# Patient Record
Sex: FEMALE | Race: WHITE
[De-identification: names, ages, dates, MRNs, and addresses within clinical notes are randomized per-mention and may not be internally consistent; named-entity substitution may affect disease eponyms.]

---

## 2015-12-01 VITALS — DIASTOLIC BLOOD PRESSURE: 68 MMHG | SYSTOLIC BLOOD PRESSURE: 134 MMHG

## 2017-06-02 ENCOUNTER — HOSPITAL ENCOUNTER (OUTPATIENT)
Dept: HOSPITAL 24 - LAB | Age: 77
End: 2017-06-02
Attending: INTERNAL MEDICINE
Payer: COMMERCIAL

## 2017-06-02 DIAGNOSIS — R06.09: ICD-10-CM

## 2017-06-02 DIAGNOSIS — I10: ICD-10-CM

## 2017-06-02 DIAGNOSIS — I25.10: Primary | ICD-10-CM

## 2017-06-02 LAB
ALBUMIN SERPL BCP-MCNC: 3.8 G/DL (ref 3.4–5)
ALP SERPL-CCNC: 64 UNITS/L (ref 46–116)
ALT SERPL W P-5'-P-CCNC: 16 UNITS/L (ref 12–78)
AST SERPL W P-5'-P-CCNC: 19 UNITS/L (ref 15–37)
BUN SERPL-MCNC: 22 MG/DL (ref 7–18)
CALCIUM ALBUM COR SERPL-SCNC: (no result) MG/DL (ref 8.5–10.1)
CALCIUM ALBUM COR SERPL-SCNC: 145 MMOL/L (ref 136–145)
CALCIUM SERPL-MCNC: 9.5 MG/DL (ref 8.5–10.1)
CHLORIDE SERPL-SCNC: 110 MMOL/L (ref 98–107)
CO2 SERPL-SCNC: 27.8 MMOL/L (ref 21–32)
CREAT SERPL-MCNC: 1.52 MG/DL (ref 0.55–1.02)
EGFR  BLACK RACES: 42 (ref 60–?)
GLUCOSE BLD-SCNC: 113 MG/DL (ref 65–99)
PROT SERPL-MCNC: 7.4 G/DL (ref 6.4–8.2)
SODIUM SERPL-SCNC: 145 MMOL/L (ref 136–145)

## 2017-06-02 PROCEDURE — 36415 COLL VENOUS BLD VENIPUNCTURE: CPT

## 2017-06-02 PROCEDURE — 80053 COMPREHEN METABOLIC PANEL: CPT

## 2017-07-18 ENCOUNTER — HOSPITAL ENCOUNTER (INPATIENT)
Dept: HOSPITAL 24 - ER | Age: 77
LOS: 3 days | Discharge: HOME | DRG: 690 | End: 2017-07-21
Attending: INTERNAL MEDICINE | Admitting: INTERNAL MEDICINE
Payer: COMMERCIAL

## 2017-07-18 VITALS — BODY MASS INDEX: 28.8 KG/M2

## 2017-07-18 DIAGNOSIS — R79.1: ICD-10-CM

## 2017-07-18 DIAGNOSIS — N30.00: Primary | ICD-10-CM

## 2017-07-18 DIAGNOSIS — I48.91: ICD-10-CM

## 2017-07-18 DIAGNOSIS — I50.9: ICD-10-CM

## 2017-07-18 DIAGNOSIS — I25.10: ICD-10-CM

## 2017-07-18 DIAGNOSIS — I10: ICD-10-CM

## 2017-07-18 DIAGNOSIS — R94.31: ICD-10-CM
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
## 2017-07-18 DIAGNOSIS — T78.49XA: ICD-10-CM

## 2017-07-18 DIAGNOSIS — R06.02: ICD-10-CM

## 2017-07-18 DIAGNOSIS — B37.89: ICD-10-CM

## 2017-07-18 DIAGNOSIS — R50.9: ICD-10-CM

## 2017-07-18 DIAGNOSIS — E78.2: ICD-10-CM

## 2017-07-18 DIAGNOSIS — K21.9: ICD-10-CM

## 2017-07-18 DIAGNOSIS — R53.1: ICD-10-CM

## 2017-07-18 DIAGNOSIS — R41.82: ICD-10-CM

## 2017-07-18 LAB
ALBUMIN SERPL BCP-MCNC: 3.7 G/DL (ref 3.4–5)
ALP SERPL-CCNC: 112 UNITS/L (ref 46–116)
ALT SERPL W P-5'-P-CCNC: 27 UNITS/L (ref 12–78)
AST SERPL W P-5'-P-CCNC: 30 UNITS/L (ref 15–37)
BACTERIA URNS QL MICRO: (no result) /HPF
BASOPHILS # BLD AUTO: 0.1 X10^3/UL (ref 0–0.1)
BASOPHILS NFR BLD AUTO: 0.4 % (ref 0.2–1)
BILIRUB UR QL STRIP.AUTO: NEGATIVE
BUN SERPL-MCNC: 28 MG/DL (ref 7–18)
CALCIUM ALBUM COR SERPL-SCNC: (no result) MG/DL (ref 8.5–10.1)
CALCIUM ALBUM COR SERPL-SCNC: 140 MMOL/L (ref 136–145)
CALCIUM SERPL-MCNC: 9.6 MG/DL (ref 8.5–10.1)
CHLORIDE SERPL-SCNC: 105 MMOL/L (ref 98–107)
CK MB SERPL-MCNC: 1 NG/ML (ref 0–4)
CK SERPL-CCNC: 70 UNITS/L (ref 26–192)
CKMB %: 1.4 % (ref ?–4)
CLARITY UR: (no result)
CO2 SERPL-SCNC: 25.1 MMOL/L (ref 21–32)
COLOR UR AUTO: YELLOW
CREAT SERPL-MCNC: 1.58 MG/DL (ref 0.55–1.02)
EGFR  BLACK RACES: 41 (ref 60–?)
EOSINOPHIL # BLD AUTO: 0.1 X10^3/UL (ref 0–0.2)
EOSINOPHIL NFR BLD AUTO: 0.6 % (ref 0.9–2.9)
ERYTHROCYTE [DISTWIDTH] IN BLOOD BY AUTOMATED COUNT: 15 % (ref 11.6–16.5)
GLUCOSE BLD-SCNC: 115 MG/DL (ref 65–99)
GLUCOSE UR QL STRIP.AUTO: NEGATIVE
HCT VFR BLD AUTO: 37.8 % (ref 36–47)
HGB BLD-MCNC: 12.7 G/DL (ref 12–16)
KETONES UR QL STRIP.AUTO: NEGATIVE
LEUKOCYTE ESTERASE UR QL STRIP.AUTO: (no result)
LYMPHOCYTES # BLD AUTO: 1.5 X10^3/UL (ref 1.3–2.9)
LYMPHOCYTES NFR BLD AUTO: 10.5 % (ref 21–51)
MCH RBC QN AUTO: 29.8 PG (ref 27–34)
MCHC RBC AUTO-ENTMCNC: 33.6 G/DL (ref 33–35)
MCV RBC AUTO: 88.7 FL (ref 80–100)
MONOCYTES # BLD AUTO: 1.2 X10^3/UL (ref 0.3–0.8)
MONOCYTES NFR BLD AUTO: 8.6 % (ref 0–13)
NEUTROPHILS # BLD AUTO: 11.6 X10^3/UL (ref 2.2–4.8)
NEUTROPHILS NFR BLD AUTO: 79.9 % (ref 42–75)
NITRITE UR QL STRIP.AUTO: NEGATIVE
PH UR STRIP.AUTO: 6 [PH] (ref 5–8)
PLATELET # BLD: 266 X10^3/UL (ref 150–450)
PMV BLD AUTO: 8.9 FL (ref 7.4–11)
PROT SERPL-MCNC: 8.2 G/DL (ref 6.4–8.2)
PROT UR QL STRIP.AUTO: (no result)
RBC # BLD AUTO: 4.26 X10^6/UL (ref 3.5–5.4)
RBC # UR STRIP.AUTO: (no result) /UL
RBC #/AREA URNS HPF: (no result) /HPF
SODIUM SERPL-SCNC: 140 MMOL/L (ref 136–145)
SP GR UR STRIP.AUTO: 1.01 (ref 1–1.03)
SQUAMOUS URNS QL MICRO: (no result) /HPF
TROPONIN I SERPL-MCNC: < 0.02 NG/ML (ref 0–1.5)
UROBILINOGEN UR QL STRIP.AUTO: (no result)
WBC NRBC COR # BLD AUTO: 14.5 X10^3/UL (ref 3.6–10)

## 2017-07-18 PROCEDURE — 80307 DRUG TEST PRSMV CHEM ANLYZR: CPT

## 2017-07-18 PROCEDURE — 85610 PROTHROMBIN TIME: CPT

## 2017-07-18 PROCEDURE — 87186 SC STD MICRODIL/AGAR DIL: CPT

## 2017-07-18 PROCEDURE — 99284 EMERGENCY DEPT VISIT MOD MDM: CPT

## 2017-07-18 PROCEDURE — 87086 URINE CULTURE/COLONY COUNT: CPT

## 2017-07-18 PROCEDURE — 83605 ASSAY OF LACTIC ACID: CPT

## 2017-07-18 PROCEDURE — 93005 ELECTROCARDIOGRAM TRACING: CPT

## 2017-07-18 PROCEDURE — 94640 AIRWAY INHALATION TREATMENT: CPT

## 2017-07-18 PROCEDURE — G0378 HOSPITAL OBSERVATION PER HR: HCPCS

## 2017-07-18 PROCEDURE — 36415 COLL VENOUS BLD VENIPUNCTURE: CPT

## 2017-07-18 PROCEDURE — 96365 THER/PROPH/DIAG IV INF INIT: CPT

## 2017-07-18 PROCEDURE — 82550 ASSAY OF CK (CPK): CPT

## 2017-07-18 PROCEDURE — 70450 CT HEAD/BRAIN W/O DYE: CPT

## 2017-07-18 PROCEDURE — 81001 URINALYSIS AUTO W/SCOPE: CPT

## 2017-07-18 PROCEDURE — 84484 ASSAY OF TROPONIN QUANT: CPT

## 2017-07-18 PROCEDURE — 71010: CPT

## 2017-07-18 PROCEDURE — 85025 COMPLETE CBC W/AUTO DIFF WBC: CPT

## 2017-07-18 PROCEDURE — 85730 THROMBOPLASTIN TIME PARTIAL: CPT

## 2017-07-18 PROCEDURE — 87040 BLOOD CULTURE FOR BACTERIA: CPT

## 2017-07-18 PROCEDURE — 87077 CULTURE AEROBIC IDENTIFY: CPT

## 2017-07-18 PROCEDURE — G0434 DRUG SCREEN MULTI DRUG CLASS: HCPCS

## 2017-07-18 PROCEDURE — 80053 COMPREHEN METABOLIC PANEL: CPT

## 2017-07-18 PROCEDURE — 82553 CREATINE MB FRACTION: CPT

## 2017-07-18 RX ADMIN — CEFTRIAXONE SCH MLS/HR: 1 INJECTION, POWDER, FOR SOLUTION INTRAMUSCULAR; INTRAVENOUS at 22:52

## 2017-07-18 RX ADMIN — SODIUM CHLORIDE SCH MLS/HR: 9 INJECTION, SOLUTION INTRAVENOUS at 22:53

## 2017-07-18 NOTE — RAD
HISTORY:  77-year-old female with cough, fever and altered mental status.



Study: Frontal view of the chest.



Comparison: Chest radiograph July 11, 2013.



Findings:



Surgical devices are stable.



The trachea is midline.  The cardiac silhouette is stably enlarged.  Low lung volumes with bibasilar
 atelectasis without focal consolidation, effusion or pneumothorax. Soft tissues are unremarkable.  
[place] structures are unremarkable.  



IMPRESSION: 

 

1.  Stable cardiomegaly with low lung volumes and bibasilar atelectasis, underlying infectious proce
ss not excluded, correlate clinically.





Reported By:Electronically Signed by JESSICA CARNEY MD at 7/18/2017 9:08:59 PM

## 2017-07-18 NOTE — DR.GENAD
HPI





- PCP


Primary Care Physician: jimy





- Complaint/Symptoms


Chief Complaint Doctors Comments: patient was standing not moving at home. 

seemed to have problems answering questions. Also some incoordination trying to 

get cup to lips.


Chief Complaint:: family states" she's not acting right she may have had a 

stroke"





- Nurses notes reviewed


Nurses Notes Review: Yes





- Source


History Provided: Patient





- Mode of Arrival


Mode of Arrival: Wheelchair





- Timing


Onset of Chief Complaint: 07/18/17


Came on: Suddenly





- Duration


Duration: Constant





- Location


Location: general





- Severity


Severity: Moderate





- Associated Signs and Symptoms


Associated Signs and Symptoms: fever





- Other History


Other History: heart valve surgery 6 weeks ago





PMH





- PMH


Past Medical History: Yes


Past Medical History: Hypertension, MI


Past Surgical History: Yes


Surgical History: CABG/Valve Surgery





- Family History


History of Family Medical Conditions: Yes


Family Medical History: Coronary Artery Disease





- Social History


Do you use any recreational Drugs:: No


Lives Where: Home





- infectious screening


In the last 2 months have you had wt loss of >10#?: NO


Have you had fever, night sweats or hemotysis?: No


Have you traveled outside the country in the last 6 months?: No


Isolation: Standard





PE





- Vital Signs


Vitals: 


 





Pulse Rate [Left Brachial]       91


Pulse Rate                       92


Respiratory Rate                 18


Blood Pressure [Right Arm]       149/65


Blood Pressure                   146/66


O2 Sat by Pulse Oximetry         99











ROR





- Labs Reviewed


Result Diagrams: 


 07/18/17 20:41





 07/18/17 20:41


Laboratory: 


 











WBC  14.5 X10^3/uL (3.6-10.0)  H  07/18/17  20:41    


 


RBC  4.26 X10^6/uL (3.5-5.4)   07/18/17  20:41    


 


Hgb  12.7 g/dL (12.0-16.0)   07/18/17  20:41    


 


Hct  37.8 % (36.0-47.0)   07/18/17  20:41    


 


MCV  88.7 fL (80.0-100.0)   07/18/17  20:41    


 


MCH  29.8 pg (27.0-34.0)   07/18/17  20:41    


 


MCHC  33.6 g/dL (33.0-35.0)   07/18/17  20:41    


 


RDW  15.0 % (11.6-16.5)   07/18/17  20:41    


 


Plt Count  266 X10^3/uL (150.0-450.0)   07/18/17  20:41    


 


MPV  8.9 fL (7.4-11.0)   07/18/17  20:41    


 


Neut %  79.9 % (42.0-75.0)  H  07/18/17  20:41    


 


Lymph %  10.5 % (21.0-51.0)  L  07/18/17  20:41    


 


Mono %  8.6 % (0.0-13.0)   07/18/17  20:41    


 


Eos %  0.6 % (0.9-2.9)  L  07/18/17  20:41    


 


Baso %  0.4 % (0.2-1.0)   07/18/17  20:41    


 


Neut #  11.6 x10^3/uL (2.2-4.8)  H  07/18/17  20:41    


 


Lymph #  1.5 X10^3/uL (1.3-2.9)   07/18/17  20:41    


 


Mono #  1.2 x10^3/uL (0.3-0.8)  H  07/18/17  20:41    


 


Eos #  0.1 x10^3/uL (0.0-0.2)   07/18/17  20:41    


 


Baso #  0.1 X10^3/uL (0.0-0.1)   07/18/17  20:41    


 


Absolute Nucleated RBC  0.0 /100WBC  07/18/17  20:41    


 


INR Target Range  -   07/18/17  20:41    


 


INR  2.79  (0.8-1.3)  H  07/18/17  20:41    


 


Sodium  140 mmol/L (136-145)   07/18/17  20:41    


 


Corrected Sodium  140 mmol/L (136-145)   07/18/17  20:41    


 


Potassium  4.8 mmol/L (3.5-5.1)   07/18/17  20:41    


 


Chloride  105 mmol/L ()   07/18/17  20:41    


 


Carbon Dioxide  25.1 mmol/L (21-32)   07/18/17  20:41    


 


BUN  28 mg/dL (7-18)  H  07/18/17  20:41    


 


Creatinine  1.58 mg/dL (0.55-1.02)  H  07/18/17  20:41    


 


Est GFR (MDRD) Af Amer  41  (>60)  L  07/18/17  20:41    


 


Est GFR (MDRD) Non-Af  34  (>60)  L  07/18/17  20:41    


 


Glucose  115 mg/dL (65-99)  H  07/18/17  20:41    


 


Lactic Acid  2.0 mmol/L (0.4-2.0)   07/18/17  20:41    


 


Calcium  9.6 mg/dL (8.5-10.1)   07/18/17  20:41    


 


Corrected Calcium  TNP   07/18/17  20:41    


 


Total Bilirubin  1.50 mg/dL (0.2-1.0)  H  07/18/17  20:41    


 


AST  30 Units/L (15-37)   07/18/17  20:41    


 


ALT  27 Units/L (12-78)   07/18/17  20:41    


 


Alkaline Phosphatase  112 Units/L ()   07/18/17  20:41    


 


Creatine Kinase  70 Units/L ()   07/18/17  20:41    


 


CK-MB (CK-2)  1.0 ng/mL (0-4.0)   07/18/17  20:41    


 


CK/CKMB % Calc  1.4 % (<4)   07/18/17  20:41    


 


Troponin I  < 0.02 ng/mL (0-1.5)   07/18/17  20:41    


 


Total Protein  8.2 g/dL (6.4-8.2)   07/18/17  20:41    


 


Albumin  3.7 g/dL (3.4-5.0)   07/18/17  20:41    


 


Globulin  4.5 g/dL (2.5-4.5)   07/18/17  20:41    


 


Albumin/Globulin Ratio  0.8 Ratio (1.1-2.1)  L  07/18/17  20:41    


 


Urine Opiates Screen  Negative  (NEG=<300)   07/18/17  21:41    


 


Urine Methadone Screen  Negative  (NEG=<300)   07/18/17  21:41    


 


Ur Barbiturates Screen  Negative  (NEG=<200)   07/18/17  21:41    


 


Ur Phencyclidine Scrn  Negative  (NEG=<25)   07/18/17  21:41    


 


Ur Amphetamines Screen  Negative  (NEG=<1000)   07/18/17  21:41    


 


U Benzodiazepines Scrn  Negative  (NEG=<200)   07/18/17  21:41    


 


Urine Cocaine Screen  Negative  (NEG=<300)   07/18/17  21:41    


 


U Marijuana (THC) Screen  Negative  (NEG=<50)   07/18/17  21:41    














- Diagnosis


Discharge Problem: 


Altered mental status


Qualifiers:


 Altered mental status type: unspecified Qualified Code(s): R41.82 - Altered 

mental status, unspecified





UTI (urinary tract infection)


Qualifiers:


 Urinary tract infection type: acute cystitis Hematuria presence: without 

hematuria Qualified Code(s): N30.00 - Acute cystitis without hematuria








- Discharge Plan


Disposition: 09 ADMITTED AS INPATIENT


Condition: Stable





- Follow ups/Referrals





- Instructions

## 2017-07-19 LAB
ALBUMIN SERPL BCP-MCNC: 3.2 G/DL (ref 3.4–5)
ALP SERPL-CCNC: 96 UNITS/L (ref 46–116)
ALT SERPL W P-5'-P-CCNC: 24 UNITS/L (ref 12–78)
AST SERPL W P-5'-P-CCNC: 26 UNITS/L (ref 15–37)
BASOPHILS # BLD AUTO: 0.1 X10^3/UL (ref 0–0.1)
BASOPHILS NFR BLD AUTO: 0.4 % (ref 0.2–1)
BUN SERPL-MCNC: 26 MG/DL (ref 7–18)
CALCIUM ALBUM COR SERPL-SCNC: 141 MMOL/L (ref 136–145)
CALCIUM ALBUM COR SERPL-SCNC: 9.7 MG/DL (ref 8.5–10.1)
CALCIUM SERPL-MCNC: 9.1 MG/DL (ref 8.5–10.1)
CHLORIDE SERPL-SCNC: 107 MMOL/L (ref 98–107)
CO2 SERPL-SCNC: 23.4 MMOL/L (ref 21–32)
CREAT SERPL-MCNC: 1.52 MG/DL (ref 0.55–1.02)
EGFR  BLACK RACES: 42 (ref 60–?)
EOSINOPHIL # BLD AUTO: 0 X10^3/UL (ref 0–0.2)
EOSINOPHIL NFR BLD AUTO: 0.2 % (ref 0.9–2.9)
ERYTHROCYTE [DISTWIDTH] IN BLOOD BY AUTOMATED COUNT: 15.1 % (ref 11.6–16.5)
GLUCOSE BLD-SCNC: 150 MG/DL (ref 65–99)
HCT VFR BLD AUTO: 36.8 % (ref 36–47)
HGB BLD-MCNC: 12.3 G/DL (ref 12–16)
LYMPHOCYTES # BLD AUTO: 1.4 X10^3/UL (ref 1.3–2.9)
LYMPHOCYTES NFR BLD AUTO: 7.6 % (ref 21–51)
MCH RBC QN AUTO: 29.8 PG (ref 27–34)
MCHC RBC AUTO-ENTMCNC: 33.5 G/DL (ref 33–35)
MCV RBC AUTO: 88.8 FL (ref 80–100)
MONOCYTES # BLD AUTO: 1.2 X10^3/UL (ref 0.3–0.8)
MONOCYTES NFR BLD AUTO: 6.4 % (ref 0–13)
NEUTROPHILS # BLD AUTO: 16.2 X10^3/UL (ref 2.2–4.8)
NEUTROPHILS NFR BLD AUTO: 85.4 % (ref 42–75)
PLATELET # BLD: 207 X10^3/UL (ref 150–450)
PMV BLD AUTO: 8.7 FL (ref 7.4–11)
PROT SERPL-MCNC: 7.5 G/DL (ref 6.4–8.2)
RBC # BLD AUTO: 4.14 X10^6/UL (ref 3.5–5.4)
SODIUM SERPL-SCNC: 140 MMOL/L (ref 136–145)
WBC NRBC COR # BLD AUTO: 19 X10^3/UL (ref 3.6–10)

## 2017-07-19 RX ADMIN — IPRATROPIUM BROMIDE AND ALBUTEROL SULFATE SCH EA: .5; 3 SOLUTION RESPIRATORY (INHALATION) at 16:20

## 2017-07-19 RX ADMIN — BENZONATATE SCH MG: 100 CAPSULE ORAL at 09:58

## 2017-07-19 RX ADMIN — MONTELUKAST SCH MG: 10 TABLET, FILM COATED ORAL at 21:50

## 2017-07-19 RX ADMIN — METOPROLOL TARTRATE SCH MG: 25 TABLET, FILM COATED ORAL at 12:00

## 2017-07-19 RX ADMIN — GUAIFENESIN DEXTROMETHORPHAN HYDROBROMIDE ORAL SOLUTION SCH ML: 200; 20 SOLUTION ORAL at 21:50

## 2017-07-19 RX ADMIN — AMLODIPINE BESYLATE SCH MG: 5 TABLET ORAL at 09:58

## 2017-07-19 RX ADMIN — IPRATROPIUM BROMIDE AND ALBUTEROL SULFATE SCH EA: .5; 3 SOLUTION RESPIRATORY (INHALATION) at 20:22

## 2017-07-19 RX ADMIN — GUAIFENESIN DEXTROMETHORPHAN HYDROBROMIDE ORAL SOLUTION SCH ML: 200; 20 SOLUTION ORAL at 16:51

## 2017-07-19 RX ADMIN — BENZONATATE SCH MG: 100 CAPSULE ORAL at 14:19

## 2017-07-19 RX ADMIN — BENZONATATE SCH MG: 100 CAPSULE ORAL at 21:48

## 2017-07-19 RX ADMIN — IPRATROPIUM BROMIDE AND ALBUTEROL SULFATE SCH EA: .5; 3 SOLUTION RESPIRATORY (INHALATION) at 11:42

## 2017-07-19 RX ADMIN — CEFTRIAXONE SCH MLS/HR: 1 INJECTION, POWDER, FOR SOLUTION INTRAMUSCULAR; INTRAVENOUS at 08:25

## 2017-07-19 RX ADMIN — IPRATROPIUM BROMIDE AND ALBUTEROL SULFATE SCH: .5; 3 SOLUTION RESPIRATORY (INHALATION) at 15:33

## 2017-07-19 RX ADMIN — LEVOFLOXACIN SCH MLS/HR: 500 INJECTION, SOLUTION INTRAVENOUS at 15:12

## 2017-07-19 RX ADMIN — FLUCONAZOLE SCH MLS/HR: 2 INJECTION, SOLUTION INTRAVENOUS at 18:32

## 2017-07-19 RX ADMIN — ASPIRIN SCH MG: 81 TABLET, COATED ORAL at 09:55

## 2017-07-19 RX ADMIN — WARFARIN SODIUM SCH MG: 2.5 TABLET ORAL at 21:50

## 2017-07-19 RX ADMIN — FUROSEMIDE SCH MG: 40 TABLET ORAL at 09:57

## 2017-07-19 RX ADMIN — METOPROLOL TARTRATE SCH MG: 25 TABLET, FILM COATED ORAL at 21:51

## 2017-07-19 RX ADMIN — SODIUM CHLORIDE SCH MLS/HR: 900 INJECTION INTRAVENOUS at 15:12

## 2017-07-19 RX ADMIN — ACETAMINOPHEN PRN MG: 325 TABLET, FILM COATED ORAL at 14:37

## 2017-07-19 RX ADMIN — SPIRONOLACTONE SCH MG: 25 TABLET, FILM COATED ORAL at 09:55

## 2017-07-19 RX ADMIN — SODIUM CHLORIDE SCH: 9 INJECTION, SOLUTION INTRAMUSCULAR; INTRAVENOUS; SUBCUTANEOUS at 22:24

## 2017-07-19 RX ADMIN — SODIUM CHLORIDE SCH: 9 INJECTION, SOLUTION INTRAMUSCULAR; INTRAVENOUS; SUBCUTANEOUS at 14:03

## 2017-07-19 RX ADMIN — SIMVASTATIN SCH MG: 40 TABLET, FILM COATED ORAL at 21:50

## 2017-07-19 RX ADMIN — SODIUM CHLORIDE SCH: 9 INJECTION, SOLUTION INTRAVENOUS at 16:37

## 2017-07-19 RX ADMIN — AMIODARONE HYDROCHLORIDE SCH MG: 200 TABLET ORAL at 09:56

## 2017-07-19 RX ADMIN — SODIUM CHLORIDE SCH: 9 INJECTION, SOLUTION INTRAMUSCULAR; INTRAVENOUS; SUBCUTANEOUS at 22:32

## 2017-07-19 RX ADMIN — SODIUM CHLORIDE SCH MLS/HR: 900 INJECTION INTRAVENOUS at 21:49

## 2017-07-19 RX ADMIN — IPRATROPIUM BROMIDE AND ALBUTEROL SULFATE SCH EA: .5; 3 SOLUTION RESPIRATORY (INHALATION) at 05:17

## 2017-07-19 NOTE — RAD
Chest, one view



Indication: Shortness of breath



Comparison: July 18, 2017



Findings:

There is stable mild cardiomegaly with prior median sternotomy changes again noted. Lungs are again 
hypoexpanded with unchanged streaky right basilar opacities, compatible with atelectasis. Left basil
ar opacities appear slightly more confluent on today's exam. No significant effusion or pneumothorax
 is identified.



Impression:

Increasing left basilar opacities, possibly reflecting atelectasis or developing pneumonia in the ap
propriate clinical setting. Clinical correlation is advised.





Reported By:Electronically Signed by KAYLEE ALEXANDER MD at 7/19/2017 10:52:55 AM

## 2017-07-19 NOTE — PCM.PROG
Progress Note





- Progress Note for Day of


Date: 07/19/17





- Subjective


Subjective: PATIENT WAS AWAKE AND ORIENTED TO PERSON, PLACE, AND TIME ON 

MORNING ROUNDS. FAMILY IS AT BEDSIDE. PATIENT DENIES ANY COMPLAINTS WEAKNESS OR 

PAIN AT THIS TIME. PATIENT IS NOTED WITH COUGH AND COMPLAINS OF SHORTNESS OF 

BREATH. VITALS THIS AM WERE 97.6, 82, 20, 94%, 128/61. ON AUSCULTATION, 

WHEEZING IS NOTED BILATERALLY. WE OBTAINED A CBC, CMP, AND CHEST XRAY. CMP 

REPORTED WBC 19.0. CMP REPORTED BUN 26, CREATININE 1.52, GLUCOSE 150, TOTAL 

BILIRUBIN 1.80, ALBUMIN 3.2. CHEST XRAY REPORTS INCREASING LEFT BASILAR 

OPACITIES, POSSIBLY REFLECTING ATELECTASIS OR DEVELOPING PNEUMONIA. LAB 

PERSONNEL CALLED CRITICAL RESULT OF BLOOD CULTURES GROWING GRAM POSITIVE COCCI. 

WE WILL DISCONTINUE ROCEPHIN AND START LEVAQUIN 500MG IV DAILY AND FORTAZ IV 

DAILY Q8H. WE WILL CHANGE DUONEBS TO QID. WE WILL RECHECK LABS AND FOLLOW UP 

WITH PATIENT IN AM.





- Past Medical Family Social History


Past Med/Fam/Surg Hx: No changes since H&P


Allergies: 


Allergies





No Known Drug Allergies Allergy (Verified 07/18/17 23:57)


 











- Review of Systems


ROS: No change since H&P





- Vital Signs and I&O's


Vital Signs: 


 





Temperature                      98.4 F


Pulse Rate [Left Brachial]       91


Pulse Rate                       70


Respiratory Rate                 20


Blood Pressure [Left Arm]        121/59


Blood Pressure [Right Arm]       111/60


O2 Sat by Pulse Oximetry         94








Intake and Output: 


 Intake & Output











 07/17/17 07/18/17 07/19/17 07/20/17





 11:59 11:59 11:59 11:59


 


Intake Total   35 


 


Output Total   300 


 


Balance   -265 














- Physical Exam


Oriented: Normal


Eyes: Normal.  negative: Blurred Vision, Diplopia, Discharge, Pain, Redness, 

Photophobia, Other


Ear: Normal.  negative: Right, Left, Swelling, Ecchymosis, Hemotypanum, Abrasion

, Laceration


Nose: Normal


Throat: Normal.  negative: Tonsillar Hypertrophy, Red, Exudate, Dry, Other


Respiratory: Right, Left, Wheezes


Cardiovascular: Normal.  negative: Tachycardia, Bradycardia, Irregular, S3, S4, 

Systolic, Diastolic, Murmur, Edema, Other


: Normal.  negative: Dysuria, Hematuria, Frequency, Discharge, Testicular Pain

, Bleeding, Pregnant, Other


Auscultation: Bowel Sounds: Normal.  negative: Bruit, Absent, Increased, 

Decreased, High Pitched, Other


Palpation: Normal


Tenderness: Normal.  negative: Diffuse, RUQ, RLQ, LUQ, LLQ, Epigastric, 

Periumbilical, Suprapubic, Mild, Moderate, Severe, Rebound, Guarding, Rigidity, 

Other


Skin: Normal.  negative: Decreased Turgur, Rash, Papular, Macular, Maculopapular

, Vesicular, Pustular, Petechial, Red, Tender, Hot, Diaphoresis, Wound, Bruising

, Ecchymosis, Other


Musculoskeletal: Normal.  negative: Right, Left, Shoulder, Clavicle, Arm, Elbow

, Forearm, Wrist, Hand, Hip, Thigh, Knee, Leg, Ankle, Foot, Back:Thoracic, Back:

Lumbar, Back:Midline, Back:Paraspinous, Pelvis, Swelling, Tender, Deformity, 

Pulse Deficit, Motor Deficit, Sensory Deficit, Instability, Crepitance


Psychiatric: Normal.  negative: Anxiety, Depression, Agitation, Other


Mood Description: Calm.  negative: Angry, Apathetic, Depressed, Fearful, Flat, 

Happy, Hostile, Sad, Suspicious, Withdrawn, Anxious, Appropriate, Labile


Affect: Normal.  negative: Angry, Anxious, Depressed, Flat, Hysterical, Quiet, 

Violent


Speech Pattern: Clear.  negative: Appropriate, Unclear, Inappropriate, Delayed, 

Slurred, Excessive, Aphasic, Artificially Ventilated





- Laboratory and Diagnostics


Result Diagrams: 


 07/20/17 03:40





 07/20/17 03:40


Labs: 


 Laboratory











WBC  19.0 X10^3/uL (3.6-10.0)  H  07/19/17  08:26    


 


RBC  4.14 X10^6/uL (3.5-5.4)   07/19/17  08:26    


 


Hgb  12.3 g/dL (12.0-16.0)   07/19/17  08:26    


 


Hct  36.8 % (36.0-47.0)   07/19/17  08:26    


 


MCV  88.8 fL (80.0-100.0)   07/19/17  08:26    


 


MCH  29.8 pg (27.0-34.0)   07/19/17  08:26    


 


MCHC  33.5 g/dL (33.0-35.0)   07/19/17  08:26    


 


RDW  15.1 % (11.6-16.5)   07/19/17  08:26    


 


Plt Count  207 X10^3/uL (150.0-450.0)   07/19/17  08:26    


 


MPV  8.7 fL (7.4-11.0)   07/19/17  08:26    


 


Neut %  85.4 % (42.0-75.0)  H  07/19/17  08:26    


 


Lymph %  7.6 % (21.0-51.0)  L  07/19/17  08:26    


 


Mono %  6.4 % (0.0-13.0)   07/19/17  08:26    


 


Eos %  0.2 % (0.9-2.9)  L  07/19/17  08:26    


 


Baso %  0.4 % (0.2-1.0)   07/19/17  08:26    


 


Neut #  16.2 x10^3/uL (2.2-4.8)  H  07/19/17  08:26    


 


Lymph #  1.4 X10^3/uL (1.3-2.9)   07/19/17  08:26    


 


Mono #  1.2 x10^3/uL (0.3-0.8)  H  07/19/17  08:26    


 


Eos #  0.0 x10^3/uL (0.0-0.2)   07/19/17  08:26    


 


Baso #  0.1 X10^3/uL (0.0-0.1)   07/19/17  08:26    


 


Absolute Nucleated RBC  0.1 /100WBC  07/19/17  08:26    


 


INR Target Range  -   07/19/17  09:09    


 


INR  2.97  (0.8-1.3)  H  07/19/17  09:09    


 


PTT  43.8 SECONDS (22.9-36.5)  H  07/19/17  09:09    


 


PTT Comment  -   07/19/17  09:09    


 


Sodium  140 mmol/L (136-145)   07/19/17  08:26    


 


Corrected Sodium  141 mmol/L (136-145)   07/19/17  08:26    


 


Potassium  4.5 mmol/L (3.5-5.1)   07/19/17  08:26    


 


Chloride  107 mmol/L ()   07/19/17  08:26    


 


Carbon Dioxide  23.4 mmol/L (21-32)   07/19/17  08:26    


 


BUN  26 mg/dL (7-18)  H  07/19/17  08:26    


 


Creatinine  1.52 mg/dL (0.55-1.02)  H  07/19/17  08:26    


 


Est GFR (MDRD) Af Amer  42  (>60)  L  07/19/17  08:26    


 


Est GFR (MDRD) Non-Af  35  (>60)  L  07/19/17  08:26    


 


Glucose  150 mg/dL (65-99)  H  07/19/17  08:26    


 


Lactic Acid  2.0 mmol/L (0.4-2.0)   07/18/17  20:41    


 


Calcium  9.1 mg/dL (8.5-10.1)   07/19/17  08:26    


 


Corrected Calcium  9.7 mg/dL (8.5-10.1)   07/19/17  08:26    


 


Total Bilirubin  1.80 mg/dL (0.2-1.0)  H  07/19/17  08:26    


 


AST  26 Units/L (15-37)   07/19/17  08:26    


 


ALT  24 Units/L (12-78)   07/19/17  08:26    


 


Alkaline Phosphatase  96 Units/L ()   07/19/17  08:26    


 


Creatine Kinase  70 Units/L ()   07/18/17  20:41    


 


CK-MB (CK-2)  1.0 ng/mL (0-4.0)   07/18/17  20:41    


 


CK/CKMB % Calc  1.4 % (<4)   07/18/17  20:41    


 


Troponin I  < 0.02 ng/mL (0-1.5)   07/18/17  20:41    


 


Total Protein  7.5 g/dL (6.4-8.2)   07/19/17  08:26    


 


Albumin  3.2 g/dL (3.4-5.0)  L  07/19/17  08:26    


 


Globulin  4.3 g/dL (2.5-4.5)   07/19/17  08:26    


 


Albumin/Globulin Ratio  0.7 Ratio (1.1-2.1)  L  07/19/17  08:26    


 


Specimen Type  Clean catch urine   07/18/17  21:41    


 


Urine Color  Yellow  (YELLOW)   07/18/17  21:41    


 


Urine Appearance  Slightly hazy  (CLEAR)   07/18/17  21:41    


 


Urine pH  6.0  (5.0 - 8.0)   07/18/17  21:41    


 


Ur Specific Gravity  1.015  (1.000-1.030)   07/18/17  21:41    


 


Urine Protein  1+  (NEGATIVE)   07/18/17  21:41    


 


Urine Glucose (UA)  Negative  (NEGATIVE)   07/18/17  21:41    


 


Urine Ketones  Negative  (NEGATIVE)   07/18/17  21:41    


 


Urine Occult Blood  2+  (NEGATIVE)   07/18/17  21:41    


 


Urine Nitrite  Negative  (NEGATIVE)   07/18/17  21:41    


 


Urine Bilirubin  Negative  (NEGATIVE)   07/18/17  21:41    


 


Urine Urobilinogen  1+  (NORMAL)   07/18/17  21:41    


 


Ur Leukocyte Esterase  2+  (NEGATIVE)   07/18/17  21:41    


 


Urine RBC  2-6 /HPF (NEGATIVE)   07/18/17  21:41    


 


Urine WBC  10-15 /HPF (NEGATIVE)   07/18/17  21:41    


 


Ur Squamous Epith Cells  Few /HPF (NEGATIVE)   07/18/17  21:41    


 


Urine Bacteria  Trace /HPF (NEGATIVE)   07/18/17  21:41    


 


Ur Culture Indicated?  Yes/culture set up   07/18/17  21:41    


 


Urine Opiates Screen  Negative  (NEG=<300)   07/18/17  21:41    


 


Urine Methadone Screen  Negative  (NEG=<300)   07/18/17  21:41    


 


Ur Barbiturates Screen  Negative  (NEG=<200)   07/18/17  21:41    


 


Ur Phencyclidine Scrn  Negative  (NEG=<25)   07/18/17  21:41    


 


Ur Amphetamines Screen  Negative  (NEG=<1000)   07/18/17  21:41    


 


U Benzodiazepines Scrn  Negative  (NEG=<200)   07/18/17  21:41    


 


Urine Cocaine Screen  Negative  (NEG=<300)   07/18/17  21:41    


 


U Marijuana (THC) Screen  Negative  (NEG=<50)   07/18/17  21:41    














- Plan


(1) UTI (urinary tract infection)


Status: Acute   


Qualifiers: 


   Urinary tract infection type: acute cystitis   Hematuria presence: without 

hematuria   Indwelling urinary catheter type: I   Encounter type: E   Qualified 

Code(s): N30.00 - Acute cystitis without hematuria   


Plan: DISCONTINUE ROCEPHIN 1 GM IV DAILY, START FORTAZ 1 GRAM IVQ8H AND 

LEVAQUIN 500MG IV DAILY, CONTINUE TO MONITOR   





(2) Fever


Status: Acute   


Qualifiers: 


   Fever type: unspecified   Encounter type: E   Qualified Code(s): R50.9 - 

Fever, unspecified   


Plan: TYLENOL 650MG PO Q4H PRN, CONTINUE TO MONITOR   





(3) Weakness generalized


Status: Acute   


Plan: CONTINUE IV FLUIDS, CONTINUE TO MONITOR

## 2017-07-19 NOTE — DR.CARTERS
Short Stay Summary





- Short Stay Summary for:


Short Stay Summary for Date of:: 07/19/17





- Admission Date


Date of Admission: 07/18/17





- Discharge Date


Discharge Date: 07/19/17





- Admission Diagnoses


(1) UTI (urinary tract infection)


Status: Acute   





- Hospital Course


Hospital Course: 


DAY 1 OF HOSPITAL STAY:  IS A 77 YEAR OLD PATIENT OF OURS WHO PRESENTED 

TO THE EMERGENCY ROOM LAST NIGHT WITH COMPLAINTS OF WEAKNESS, INCOORDINATION, 

AND FEVER. FAMILY ALSO STATED THAT PATIENT HAD BEEN CONFUSED AND THAT SYMPTOMS 

STARTED SUDDENLY PRIOR TO COMING TO THE ER. PATIENT IS S/P MITRAL VALVE 

REPLACEMENT 6 WEEKS AGO. PATIENT DENIED ANY CHEST PAIN, HOWEVER, WAS NOTED WITH 

COUGH. ON ARRIVAL TO ER, VITALS WERE 101.0. 92, 18, 98%, 146/66. LABS REPORTED 

A NORMAL CBC EXCEPT WBC 14.5. CMP WITHIN NORMAL LIMITS EXCEPT VUN 28, 

CREATININE 1.58, GLUCOSE 115, TOTAL BILIRUBIN 1.50. INR 2.79. URINALYSIS 

REPORTED WBC 10-15, RBC 2-6, LEUKOCYTES 2+, OCCULT BLOOD 2+, PROTEIN 1+. WE 

ADMITTED PATIENT FOR FUTHER TREATMENT AND EVALUATION. WE WILL START PATIENT ON 

NS @80ML/HR, START ROCEPHIN 1GM IV DAILY, AND DUONEBS TID. WE WILL RECHECK LABS 

AND CHEST XRAY AND FOLLOW UP WITH PATIENT IN AM. 





DAY 2 OF HOSPITAL STAY: PATIENT WAS AWAKE AND ORIENTED TO PERSON, PLACE, AND 

TIME ON MORNING ROUNDS. FAMILY IS AT BEDSIDE. PATIENT DENIES ANY COMPLAINTS 

WEAKNESS OR PAIN AT THIS TIME. PATIENT IS NOTED WITH COUGH. VITALS THIS AM WERE 

97.6, 82, 20, 94%, 128/61. WE OBTAINED A CBC, CMP, AND CHEST XRAY. CMP REPORTED 

WBC 19.0. CMP REPORTED BUN 26, CREATININE 1.52, GLUCOSE 150, TOTAL BILIRUBIN 

1.80, ALBUMIN 3.2. CHEST XRAY REPORTS INCREASING LEFT BASILAR OPACITIES, 

POSSIBLY REFLECTING ATELECTASIS OR DEVELOPING PNEUMONIA. PATIENT VERBALIZES 

WANTING TO BE DISCHARGED TO HOME TODAY. FAMILY IS IN AGREEMENT AND STATES THAT 

SOMEONE WILL BE AT HOME TO SEE TO PATIENT AFTER DISCHARGE. WE PLANNED FOR 

DISCHARGE. INSTRUCTIONS FOR MEDICATIONS AND FOLLOW UP WERE GIVEN TO PATIENT AND 

FAMILY. BOTH VERBALIZE UNDERSTANDING. PATIENT WILL BE DISCHARGE HOME IN STABLE 

CONDITION WITH FAMILY.SHE WILL CONTINUE HOME MEDICATIONS. WE WILL START HER ON 

AUGMENTIN 875 BID FOR 10 DAYS AND TESSALON PEARLS 200MG TID PRN COUGH. PATIENT 

IS INSTRUCTED TO FOLLOW UP IN OFFICE ON FRIDAY. 








- Discharge Medications


Discharge Medications: 


 





Amiodarone HCl 1 tab PO DAILY 07/18/17 [History]


Amlodipine Besylate [NORVASC 5 MG *] 1 tab PO DAILY 07/18/17 [History]


Furosemide 1 tab PO DAILY 07/18/17 [History]


Olmesartan Medoxomil [Benicar] 1 tab PO DAILY 07/18/17 [History]


Spironolactone 0.5 tab PO DAILY 07/18/17 [History]


Warfarin Sodium 1 tab PO HS 07/18/17 [History]


Amoxicillin/Potassium Clav [Augmentin 875-125 Tablet] 1 tab PO Q12H #20 tab 07/ 19/17 [Rx]


Benzonatate [TESSALON PERLES *] 200 mg PO TID PRN #30 cap 07/19/17 [Rx]


Metoprolol Tartrate 25 mg PO HS 07/19/17 [History]











- Discharge Plan


Disposition:  HOME, SELF-CARE


Condition: Stable


Prescriptions: 


Amoxicillin/Potassium Clav [Augmentin 875-125 Tablet] 1 tab PO Q12H #20 tab


Benzonatate [TESSALON PERLES *] 200 mg PO TID PRN #30 cap


 PRN Reason: Cough





- Follow up/Referrals


Follow up/Referrals: 


Jackson Alvarado [Primary Care Provider] - 07/21/17 10:00 am





- Instructions


Instructions:  Confusion, Warfarin: What You Need to Know, Urinary Tract 

Infection, Easy-to-Read, Hypertension, Easy-to-Read, Heart Failure, Easy-to-Read

, Ciprofloxacin tablets


Additional Instructions: 


ACTIVITY AS TOLERATED. DIET AS TOLERATED. 


Forms:  Patient Portal

## 2017-07-19 NOTE — DR.H&P
H&P





- History & Physical for Day of:


H&P Date: 07/18/17





- Chief Complaint


Chief Complaint: WEAKNESS, FEVER, SHORTNESS OF BREATH





- Allergies


Allergies/Adverse Reactions: 


 Allergies











Allergy/AdvReac Type Severity Reaction Status Date / Time


 


No Known Drug Allergies Allergy   Verified 07/18/17 23:57














- History of Present Illness


History of Present Illness:  IS A 77 YEAR OLD PATIENT OF OURS WHO 

PRESENTED TO THE EMERGENCY ROOM LAST NIGHT WITH COMPLAINTS OF WEAKNESS, 

INCOORDINATION, AND FEVER. FAMILY ALSO STATED THAT PATIENT HAD BEEN CONFUSED 

AND THAT SYMPTOMS STARTED SUDDENLY PRIOR TO COMING TO THE ER. PATIENT IS S/P 

MITRAL VALVE REPLACEMENT 6 WEEKS AGO. PATIENT DENIED ANY CHEST PAIN, HOWEVER, 

WAS NOTED WITH COUGH AND SHORTNESS OF BREATH. ON ARRIVAL TO ER, VITALS WERE 

101.0. 92, 18, 98%, 146/66. LABS REPORTED A NORMAL CBC EXCEPT WBC 14.5. CMP 

WITHIN NORMAL LIMITS EXCEPT VUN 28, CREATININE 1.58, GLUCOSE 115, TOTAL 

BILIRUBIN 1.50. INR 2.79. URINALYSIS REPORTED WBC 10-15, RBC 2-6, LEUKOCYTES 2+

, OCCULT BLOOD 2+, PROTEIN 1+. WE ADMITTED PATIENT FOR FUTHER TREATMENT AND 

EVALUATION. WE WILL START PATIENT ON NS @80ML/HR, START ROCEPHIN 1GM IV DAILY, 

AND DUONEBS TID. WE WILL RECHECK LABS AND CHEST XRAY AND FOLLOW UP WITH PATIENT 

IN AM.





- Past Medical History


Past Medical History: Angina, CHF, Coronary Artery Disease, Dyslipidemia, GERD, 

Hypertension, MI


Additional Medical History: CARDIAC ARRYTHMIA





- Past Surgical History


Surgical History: CABG/Valve Surgery


Additional Surgical History: MITRAL VALVE REPLACEMENT X 2





- Family History


Family Medical History: Coronary Artery Disease





- Social History


Does patient currently use any type of tobacco product: No


Have you used tobacco products in the last 12 months: No


Type of Tobacco Use: None


Alcohol Use: None


Drug Use: None





- Medications


Home Medications: 


 





Amiodarone HCl 1 tab PO DAILY 07/18/17 [History Confirmed 07/18/17]


Amlodipine Besylate [NORVASC 5 MG *] 1 tab PO DAILY 07/18/17 [History Confirmed 

07/18/17]


Furosemide 1 tab PO DAILY 07/18/17 [History Confirmed 07/18/17]


Olmesartan Medoxomil [Benicar] 1 tab PO DAILY 07/18/17 [History Confirmed 07/18/ 17]


Spironolactone 0.5 tab PO DAILY 07/18/17 [History Confirmed 07/18/17]


Warfarin Sodium 1 tab PO HS 07/18/17 [History Confirmed 07/18/17]


Metoprolol Tartrate 25 mg PO HS 07/19/17 [History Confirmed 07/19/17]











- Review of Systems


Constitutional: Fever, Weakness


Eyes: No Symptoms Reported.  denies: See HPI, Pain, Vision Change, Conjunctivae 

Inflammation, Eyelid Inflammation, Redness, Other


ENT: No Symptoms Reported.  denies: See HPI, Ear Pain, Ear Discharge, Nose Pain

, Nose Discharge, Nose Congestion, Mouth Pain, Mouth Swelling, Throat Pain, 

Throat Swelling, Other


Respiratory: Cough, Shortness of Breath.  denies: No Symptoms Reported, See HPI

, Dry, Hemoptysis, SOB with Excertion, Pleuritic Pain, Sputum, Wheezing, Other


Cardiovascular: No Symptoms Reported.  denies: Chest Pain, See HPI, Palpitations

, Orthopnea, Paroxysmal Noc. Dyspnea, Edema, Light Headedness, Other


Gastrointestinal: No Symptoms Reported.  denies: See HPI, Nausea, Vomiting, 

Abdominal Pain, Diarrhea, Constipation, Melena, Hematochezia, Other


Genitourinary: No Symptoms Reported.  denies: See HPI, Dysuria, Frequency, 

Incontinence, Hematuria, Retention, Other


Musculoskeletal: No Symptoms Reported.  denies: See HPI, Shoulder Pain, Arm Pain

, Back Pain, Hand Pain, Leg Pain, Foot Pain, Neck Pain, Other


Skin: No Symptoms Reported.  denies: See HPI, Rash, Lesions, Jaundice, Bruising

, Wound, Ecchymosis, Other


Neurological: Weakness, Incoordination, Confusion.  denies: See HPI, Numbness, 

Change in Speech, Seizures, Other





- Physical Exam


Vital Signs: 


 





Temperature                      98.4 F


Pulse Rate [Left Brachial]       91


Pulse Rate                       70


Respiratory Rate                 20


Blood Pressure [Left Arm]        121/59


Blood Pressure [Right Arm]       111/60


O2 Sat by Pulse Oximetry         94








Oriented: Not Oriented.  negative: Normal, Time, Person, Place, Unable to test, 

Other


Eyes: Normal.  negative: Blurred Vision, Diplopia, Discharge, Pain, Redness, 

Photophobia, Other


Ear: Normal.  negative: Right, Left, Swelling, Ecchymosis, Hemotypanum, Abrasion

, Laceration


Nose: Normal


Throat: Normal.  negative: Tonsillar Hypertrophy, Red, Exudate, Dry, Other


Respiratory: Clear Throughout.  negative: Diminished Throughout, Rhonchi 

Throughout, Rales Throughout, Wheezes Throughout, RUL Clear, RML Clear, RLL 

Clear, BEVERLY Clear, LML Clear, LLL Clear, RUL Diminished, RML Diminished, RLL 

Diminished, BEVERLY Diminished, LML Diminished, LLL Diminished, RUL Absent, RML 

Absent, RLL Absent, BEVERLY Absent, LML Absent, LLL Absent, RUL Rhonchi, RML Rhonchi

, RLL Rhonchi, BEVERLY Rhonchi, LML Rhonchi, LLL Rhonchi, RUL Insp. Wheeze, RML 

Insp. Wheeze, RLL Insp. Wheeze, BEVERLY Insp.Wheeze, LML Insp.Wheeze, LLL 

Insp.Wheeze, RUL Exp. Wheeze, RML Exp. Wheeze, RLL Exp. Wheeze, BEVERLY Exp. Wheeze

, LML Exp. Wheeze, LLL Exp. Wheeze, RUL Rales, RML Rales, RLL Rales, BEVERLY Rales, 

LML Rales, LLL Rales, RUL Rub, RML Rub, RLL Rub, BEVERLY Rub, LML Rub, LLL Rub, RUL 

Squeak, RML Squeak, RLL Squeak, BEVERLY Squeak, LML Squeak, LLL Squeak


Cardiovascular: Normal.  negative: Tachycardia, Bradycardia, Irregular, S3, S4, 

Systolic, Diastolic, Murmur, Edema, Other


: Normal.  negative: Dysuria, Hematuria, Frequency, Discharge, Testicular Pain

, Bleeding, Pregnant, Other


Auscultation: Bowel Sounds: Normal.  negative: Bruit, Absent, Increased, 

Decreased, High Pitched, Other


Palpation: Normal.  negative: Spleen Enlarged, Liver Enlarged, Mass Pulsatile, 

Other


Tenderness: Normal.  negative: Diffuse, RUQ, RLQ, LUQ, LLQ, Epigastric, 

Periumbilical, Suprapubic, Mild, Moderate, Severe, Rebound, Guarding, Rigidity, 

Other


Skin: Normal.  negative: Decreased Turgur, Rash, Papular, Macular, Maculopapular

, Vesicular, Pustular, Petechial, Red, Tender, Hot, Diaphoresis, Wound, Bruising

, Ecchymosis, Other


Musculoskeletal: Normal.  negative: Right, Left, Shoulder, Clavicle, Arm, Elbow

, Forearm, Wrist, Hand, Hip, Thigh, Knee, Leg, Ankle, Foot, Back:Thoracic, Back:

Lumbar, Back:Midline, Back:Paraspinous, Pelvis, Swelling, Tender, Deformity, 

Pulse Deficit, Motor Deficit, Sensory Deficit, Instability, Crepitance


Psychiatric: Normal.  negative: Anxiety, Depression, Agitation, Other


Mood Description: Calm.  negative: Angry, Apathetic, Depressed, Fearful, Flat, 

Happy, Hostile, Sad, Suspicious, Withdrawn, Anxious, Appropriate, Labile


Affect: Normal.  negative: Angry, Anxious, Depressed, Flat, Hysterical, Quiet, 

Violent


Speech Pattern: Clear.  negative: Appropriate, Unclear, Inappropriate, Delayed, 

Slurred, Excessive, Aphasic, Artificially Ventilated





- Assessment/Plan


(1) UTI (urinary tract infection)


Qualifiers: 


   Urinary tract infection type: acute cystitis   Hematuria presence: without 

hematuria   Indwelling urinary catheter type: I   Encounter type: E   Qualified 

Code(s): N30.00 - Acute cystitis without hematuria   


Status: Acute   


Plan: START ROCEPHIN 1 GM IV DAILY, CONTINUE TO MONITOR   





(2) Altered mental status


Qualifiers: 


   Altered mental status type: unspecified   Coma depth: C   Coma timing: C   

Qualified Code(s): R41.82 - Altered mental status, unspecified   


Status: Acute   


Plan: CONTINUE TO MONITOR   





(3) Fever


Qualifiers: 


   Fever type: unspecified   Encounter type: E   Qualified Code(s): R50.9 - 

Fever, unspecified   


Status: Acute   


Plan: TYLENOL 650 MG PO Q4H PRN, CONTINUE TO MONITOR   





(4) Weakness generalized


Status: Acute   


Plan: IV FLUIDS, CONTINUE TO MONITOR, PT CONSULT

## 2017-07-20 LAB
ALBUMIN SERPL BCP-MCNC: 2.8 G/DL (ref 3.4–5)
ALP SERPL-CCNC: 86 UNITS/L (ref 46–116)
ALT SERPL W P-5'-P-CCNC: 21 UNITS/L (ref 12–78)
AST SERPL W P-5'-P-CCNC: 23 UNITS/L (ref 15–37)
BASOPHILS # BLD AUTO: 0.1 X10^3/UL (ref 0–0.1)
BASOPHILS NFR BLD AUTO: 0.9 % (ref 0.2–1)
BUN SERPL-MCNC: 27 MG/DL (ref 7–18)
CALCIUM ALBUM COR SERPL-SCNC: (no result) MMOL/L (ref 136–145)
CALCIUM ALBUM COR SERPL-SCNC: 9.3 MG/DL (ref 8.5–10.1)
CALCIUM SERPL-MCNC: 8.3 MG/DL (ref 8.5–10.1)
CHLORIDE SERPL-SCNC: 106 MMOL/L (ref 98–107)
CO2 SERPL-SCNC: 24.3 MMOL/L (ref 21–32)
CREAT SERPL-MCNC: 1.44 MG/DL (ref 0.55–1.02)
EGFR  BLACK RACES: 45 (ref 60–?)
EOSINOPHIL # BLD AUTO: 0.1 X10^3/UL (ref 0–0.2)
EOSINOPHIL NFR BLD AUTO: 0.8 % (ref 0.9–2.9)
ERYTHROCYTE [DISTWIDTH] IN BLOOD BY AUTOMATED COUNT: 15.3 % (ref 11.6–16.5)
GLUCOSE BLD-SCNC: 94 MG/DL (ref 65–99)
HCT VFR BLD AUTO: 33.8 % (ref 36–47)
HGB BLD-MCNC: 11.5 G/DL (ref 12–16)
LYMPHOCYTES # BLD AUTO: 2 X10^3/UL (ref 1.3–2.9)
LYMPHOCYTES NFR BLD AUTO: 14.4 % (ref 21–51)
MCH RBC QN AUTO: 29.9 PG (ref 27–34)
MCHC RBC AUTO-ENTMCNC: 33.9 G/DL (ref 33–35)
MCV RBC AUTO: 88.4 FL (ref 80–100)
MONOCYTES # BLD AUTO: 1.2 X10^3/UL (ref 0.3–0.8)
MONOCYTES NFR BLD AUTO: 8.9 % (ref 0–13)
NEUTROPHILS # BLD AUTO: 10.5 X10^3/UL (ref 2.2–4.8)
NEUTROPHILS NFR BLD AUTO: 75 % (ref 42–75)
PLATELET # BLD: 190 X10^3/UL (ref 150–450)
PMV BLD AUTO: 9.7 FL (ref 7.4–11)
PROT SERPL-MCNC: 7 G/DL (ref 6.4–8.2)
RBC # BLD AUTO: 3.83 X10^6/UL (ref 3.5–5.4)
SODIUM SERPL-SCNC: 139 MMOL/L (ref 136–145)
WBC NRBC COR # BLD AUTO: 13.9 X10^3/UL (ref 3.6–10)

## 2017-07-20 RX ADMIN — METOPROLOL TARTRATE SCH MG: 25 TABLET, FILM COATED ORAL at 09:58

## 2017-07-20 RX ADMIN — GUAIFENESIN DEXTROMETHORPHAN HYDROBROMIDE ORAL SOLUTION SCH ML: 200; 20 SOLUTION ORAL at 17:45

## 2017-07-20 RX ADMIN — SODIUM CHLORIDE SCH MLS/HR: 9 INJECTION, SOLUTION INTRAVENOUS at 17:46

## 2017-07-20 RX ADMIN — SODIUM CHLORIDE SCH ML: 9 INJECTION, SOLUTION INTRAMUSCULAR; INTRAVENOUS; SUBCUTANEOUS at 06:08

## 2017-07-20 RX ADMIN — IPRATROPIUM BROMIDE AND ALBUTEROL SULFATE SCH EA: .5; 3 SOLUTION RESPIRATORY (INHALATION) at 12:05

## 2017-07-20 RX ADMIN — SODIUM CHLORIDE SCH MLS/HR: 900 INJECTION INTRAVENOUS at 14:09

## 2017-07-20 RX ADMIN — IPRATROPIUM BROMIDE AND ALBUTEROL SULFATE SCH EA: .5; 3 SOLUTION RESPIRATORY (INHALATION) at 16:05

## 2017-07-20 RX ADMIN — OLMESARTAN MEDOXOMIL SCH MG: 40 TABLET, FILM COATED ORAL at 09:51

## 2017-07-20 RX ADMIN — ACETAMINOPHEN PRN MG: 325 TABLET, FILM COATED ORAL at 07:44

## 2017-07-20 RX ADMIN — FLUCONAZOLE SCH MLS/HR: 2 INJECTION, SOLUTION INTRAVENOUS at 09:51

## 2017-07-20 RX ADMIN — AMLODIPINE BESYLATE SCH MG: 5 TABLET ORAL at 09:58

## 2017-07-20 RX ADMIN — BENZONATATE SCH MG: 100 CAPSULE ORAL at 14:09

## 2017-07-20 RX ADMIN — SODIUM CHLORIDE SCH: 9 INJECTION, SOLUTION INTRAVENOUS at 01:36

## 2017-07-20 RX ADMIN — WARFARIN SODIUM SCH MG: 2.5 TABLET ORAL at 21:46

## 2017-07-20 RX ADMIN — BENZONATATE SCH MG: 100 CAPSULE ORAL at 06:07

## 2017-07-20 RX ADMIN — AMIODARONE HYDROCHLORIDE SCH MG: 200 TABLET ORAL at 09:53

## 2017-07-20 RX ADMIN — BENZONATATE SCH MG: 100 CAPSULE ORAL at 21:48

## 2017-07-20 RX ADMIN — SODIUM CHLORIDE SCH: 9 INJECTION, SOLUTION INTRAMUSCULAR; INTRAVENOUS; SUBCUTANEOUS at 17:45

## 2017-07-20 RX ADMIN — SPIRONOLACTONE SCH MG: 25 TABLET, FILM COATED ORAL at 09:54

## 2017-07-20 RX ADMIN — IPRATROPIUM BROMIDE AND ALBUTEROL SULFATE SCH EA: .5; 3 SOLUTION RESPIRATORY (INHALATION) at 20:56

## 2017-07-20 RX ADMIN — METOPROLOL TARTRATE SCH MG: 25 TABLET, FILM COATED ORAL at 21:48

## 2017-07-20 RX ADMIN — LEVOFLOXACIN SCH MLS/HR: 500 INJECTION, SOLUTION INTRAVENOUS at 09:58

## 2017-07-20 RX ADMIN — MONTELUKAST SCH MG: 10 TABLET, FILM COATED ORAL at 21:48

## 2017-07-20 RX ADMIN — SODIUM CHLORIDE SCH MLS/HR: 900 INJECTION INTRAVENOUS at 21:55

## 2017-07-20 RX ADMIN — ASPIRIN SCH MG: 81 TABLET, COATED ORAL at 09:52

## 2017-07-20 RX ADMIN — GUAIFENESIN DEXTROMETHORPHAN HYDROBROMIDE ORAL SOLUTION SCH ML: 200; 20 SOLUTION ORAL at 14:10

## 2017-07-20 RX ADMIN — GUAIFENESIN DEXTROMETHORPHAN HYDROBROMIDE ORAL SOLUTION SCH ML: 200; 20 SOLUTION ORAL at 09:51

## 2017-07-20 RX ADMIN — FUROSEMIDE SCH MG: 40 TABLET ORAL at 09:52

## 2017-07-20 RX ADMIN — SODIUM CHLORIDE SCH MLS/HR: 900 INJECTION INTRAVENOUS at 06:08

## 2017-07-20 RX ADMIN — SIMVASTATIN SCH MG: 40 TABLET, FILM COATED ORAL at 21:47

## 2017-07-20 RX ADMIN — IPRATROPIUM BROMIDE AND ALBUTEROL SULFATE SCH EA: .5; 3 SOLUTION RESPIRATORY (INHALATION) at 09:32

## 2017-07-20 RX ADMIN — GUAIFENESIN DEXTROMETHORPHAN HYDROBROMIDE ORAL SOLUTION SCH ML: 200; 20 SOLUTION ORAL at 21:50

## 2017-07-20 NOTE — RAD
HISTORY:  Cough, shortness of breath



Study:  Single-view chest, done portably



Comparison: July 19, 2017



Findings:



There is again evidence of CABG with median sternotomy sutures and metallic markers indicating coron
sun artery bypass grafts. The trachea is midline. Heart size is upper normal with mild pulmonary vas
cular congestion. Increased interstitial markings are present bilaterally. An area of linear atelect
asis or scarring is present in the right lower lobe. Improving aeration is noted in the left lung ba
se. Osseous structures are intact.



IMPRESSION:

 

Interval improvement in aeration as noted above.





Reported By:Electronically Signed by SALENA MOSES MD at 7/20/2017 6:32:39 AM

## 2017-07-20 NOTE — PCM.PROG
Progress Note





- Progress Note for Day of


Date: 07/20/17





- Subjective


Subjective: PATIENT WAS AWAKE AND ORIENTED TO PERSON, PLACE, AND TIME ON 

MORNING ROUNDS. FAMILY IS AT BEDSIDE. PATIENT IS NOTED WITH COMPLAINTS OF COUGH 

AND SHORTNESS OF BREATH. VITALS THIS AM WERE 98.6, 82, 18, 91%, 124/60. PATIENT 

WAS AFEBRILE THROUGHOUT THE NIGHT. ON AUSCULTATION, WHEEZING IS NOTED 

BILATERALLY. WE OBTAINED A CBC, CMP, AND CHEST XRAY. CMP REPORTED WBC INCREASED 

FROM 19.0 TO 13.9, HGB 11.5, HCT 33.8. CMP REPORTED BUN 27, CREATININE 1.44, 

TOTAL BILIRUBIN 1.30, ALBUMIN 2.8. CHEST XRAY REPORTS MILD PULMONARY VASCULAR 

CONGESTION. AREA OF LINEAR ATELECTASIS OR SCARRING IS PRESENT IN THE RIGHT 

LOWER LOBE. IMPROVING AERATION NOTED IN THE LEFT LUNG BASE. WE WILL START 

DIFLUCAN IV FOR YEAST INFECTION. WE WILL CONTINUE WITH CURRENT PLAN OF 

TREATMENT. WE WILL RECHECK LABS AND CHEST XRAY AND FOLLOW UP WITH PATIENT IN AM.





- Past Medical Family Social History


Past Med/Fam/Surg Hx: No changes since H&P


Allergies: 


Allergies





No Known Drug Allergies Allergy (Verified 07/18/17 23:57)


 











- Review of Systems


ROS: No change since H&P





- Vital Signs and I&O's


Vital Signs: 


 





Temperature                      98.6 F


Pulse Rate [Left Brachial]       82


Pulse Rate                       71


Respiratory Rate                 18


Blood Pressure [Left Arm]        124/60


O2 Sat by Pulse Oximetry         91








Intake and Output: 


 Intake & Output











 07/17/17 07/18/17 07/19/17 07/20/17





 11:59 11:59 11:59 11:59


 


Intake Total    1147


 


Output Total    400


 


Balance    747














- Physical Exam


Oriented: Not Oriented.  negative: Normal, Time, Person, Place, Unable to test, 

Other


Eyes: Normal.  negative: Blurred Vision, Diplopia, Discharge, Pain, Redness, 

Photophobia, Other


Ear: Normal.  negative: Right, Left, Swelling, Ecchymosis, Hemotypanum, Abrasion

, Laceration


Nose: Normal


Throat: Normal.  negative: Tonsillar Hypertrophy, Red, Exudate, Dry, Other


Respiratory: Right, Left, Wheezes


Cardiovascular: Normal.  negative: Tachycardia, Bradycardia, Irregular, S3, S4, 

Systolic, Diastolic, Murmur, Edema, Other


: Normal.  negative: Dysuria, Hematuria, Frequency, Discharge, Testicular Pain

, Bleeding, Pregnant, Other


Auscultation: Bowel Sounds: Normal.  negative: Bruit, Absent, Increased, 

Decreased, High Pitched, Other


Palpation: Normal


Tenderness: Normal.  negative: Diffuse, RUQ, RLQ, LUQ, LLQ, Epigastric, 

Periumbilical, Suprapubic, Mild, Moderate, Severe, Rebound, Guarding, Rigidity, 

Other


Skin: Normal.  negative: Decreased Turgur, Rash, Papular, Macular, Maculopapular

, Vesicular, Pustular, Petechial, Red, Tender, Hot, Diaphoresis, Wound, Bruising

, Ecchymosis, Other


Musculoskeletal: Normal.  negative: Right, Left, Shoulder, Clavicle, Arm, Elbow

, Forearm, Wrist, Hand, Hip, Thigh, Knee, Leg, Ankle, Foot, Back:Thoracic, Back:

Lumbar, Back:Midline, Back:Paraspinous, Pelvis, Swelling, Tender, Deformity, 

Pulse Deficit, Motor Deficit, Sensory Deficit, Instability, Crepitance


Psychiatric: Normal.  negative: Anxiety, Depression, Agitation, Other


Mood Description: Calm.  negative: Angry, Apathetic, Depressed, Fearful, Flat, 

Happy, Hostile, Sad, Suspicious, Withdrawn, Anxious, Appropriate, Labile


Affect: Normal.  negative: Angry, Anxious, Depressed, Flat, Hysterical, Quiet, 

Violent


Speech Pattern: Clear.  negative: Appropriate, Unclear, Inappropriate, Delayed, 

Slurred, Excessive, Aphasic, Artificially Ventilated





- Laboratory and Diagnostics


Result Diagrams: 


 07/20/17 03:40





 07/20/17 03:40


Labs: 


 Laboratory











WBC  13.9 X10^3/uL (3.6-10.0)  H  07/20/17  03:40    


 


RBC  3.83 X10^6/uL (3.5-5.4)   07/20/17  03:40    


 


Hgb  11.5 g/dL (12.0-16.0)  L  07/20/17  03:40    


 


Hct  33.8 % (36.0-47.0)  L  07/20/17  03:40    


 


MCV  88.4 fL (80.0-100.0)   07/20/17  03:40    


 


MCH  29.9 pg (27.0-34.0)   07/20/17  03:40    


 


MCHC  33.9 g/dL (33.0-35.0)   07/20/17  03:40    


 


RDW  15.3 % (11.6-16.5)   07/20/17  03:40    


 


Plt Count  190 X10^3/uL (150.0-450.0)   07/20/17  03:40    


 


MPV  9.7 fL (7.4-11.0)   07/20/17  03:40    


 


Neut %  75.0 % (42.0-75.0)   07/20/17  03:40    


 


Lymph %  14.4 % (21.0-51.0)  L  07/20/17  03:40    


 


Mono %  8.9 % (0.0-13.0)   07/20/17  03:40    


 


Eos %  0.8 % (0.9-2.9)  L  07/20/17  03:40    


 


Baso %  0.9 % (0.2-1.0)   07/20/17  03:40    


 


Neut #  10.5 x10^3/uL (2.2-4.8)  H  07/20/17  03:40    


 


Lymph #  2.0 X10^3/uL (1.3-2.9)   07/20/17  03:40    


 


Mono #  1.2 x10^3/uL (0.3-0.8)  H  07/20/17  03:40    


 


Eos #  0.1 x10^3/uL (0.0-0.2)   07/20/17  03:40    


 


Baso #  0.1 X10^3/uL (0.0-0.1)   07/20/17  03:40    


 


Absolute Nucleated RBC  0.0 /100WBC  07/20/17  03:40    


 


INR Target Range  -   07/19/17  09:09    


 


INR  2.97  (0.8-1.3)  H  07/19/17  09:09    


 


PTT  43.8 SECONDS (22.9-36.5)  H  07/19/17  09:09    


 


PTT Comment  -   07/19/17  09:09    


 


Sodium  139 mmol/L (136-145)   07/20/17  03:40    


 


Corrected Sodium  TNP   07/20/17  03:40    


 


Potassium  3.9 mmol/L (3.5-5.1)   07/20/17  03:40    


 


Chloride  106 mmol/L ()   07/20/17  03:40    


 


Carbon Dioxide  24.3 mmol/L (21-32)   07/20/17  03:40    


 


BUN  27 mg/dL (7-18)  H  07/20/17  03:40    


 


Creatinine  1.44 mg/dL (0.55-1.02)  H  07/20/17  03:40    


 


Est GFR (MDRD) Af Amer  45  (>60)  L  07/20/17  03:40    


 


Est GFR (MDRD) Non-Af  37  (>60)  L  07/20/17  03:40    


 


Glucose  94 mg/dL (65-99)   07/20/17  03:40    


 


Lactic Acid  2.0 mmol/L (0.4-2.0)   07/18/17  20:41    


 


Calcium  8.3 mg/dL (8.5-10.1)  L  07/20/17  03:40    


 


Corrected Calcium  9.3 mg/dL (8.5-10.1)   07/20/17  03:40    


 


Total Bilirubin  1.30 mg/dL (0.2-1.0)  H  07/20/17  03:40    


 


AST  23 Units/L (15-37)   07/20/17  03:40    


 


ALT  21 Units/L (12-78)   07/20/17  03:40    


 


Alkaline Phosphatase  86 Units/L ()   07/20/17  03:40    


 


Creatine Kinase  70 Units/L ()   07/18/17  20:41    


 


CK-MB (CK-2)  1.0 ng/mL (0-4.0)   07/18/17  20:41    


 


CK/CKMB % Calc  1.4 % (<4)   07/18/17  20:41    


 


Troponin I  < 0.02 ng/mL (0-1.5)   07/18/17  20:41    


 


Total Protein  7.0 g/dL (6.4-8.2)   07/20/17  03:40    


 


Albumin  2.8 g/dL (3.4-5.0)  L  07/20/17  03:40    


 


Globulin  4.2 g/dL (2.5-4.5)   07/20/17  03:40    


 


Albumin/Globulin Ratio  0.7 Ratio (1.1-2.1)  L  07/20/17  03:40    


 


Specimen Type  Clean catch urine   07/18/17  21:41    


 


Urine Color  Yellow  (YELLOW)   07/18/17  21:41    


 


Urine Appearance  Slightly hazy  (CLEAR)   07/18/17  21:41    


 


Urine pH  6.0  (5.0 - 8.0)   07/18/17  21:41    


 


Ur Specific Gravity  1.015  (1.000-1.030)   07/18/17  21:41    


 


Urine Protein  1+  (NEGATIVE)   07/18/17  21:41    


 


Urine Glucose (UA)  Negative  (NEGATIVE)   07/18/17  21:41    


 


Urine Ketones  Negative  (NEGATIVE)   07/18/17  21:41    


 


Urine Occult Blood  2+  (NEGATIVE)   07/18/17  21:41    


 


Urine Nitrite  Negative  (NEGATIVE)   07/18/17  21:41    


 


Urine Bilirubin  Negative  (NEGATIVE)   07/18/17  21:41    


 


Urine Urobilinogen  1+  (NORMAL)   07/18/17  21:41    


 


Ur Leukocyte Esterase  2+  (NEGATIVE)   07/18/17  21:41    


 


Urine RBC  2-6 /HPF (NEGATIVE)   07/18/17  21:41    


 


Urine WBC  10-15 /HPF (NEGATIVE)   07/18/17  21:41    


 


Ur Squamous Epith Cells  Few /HPF (NEGATIVE)   07/18/17  21:41    


 


Urine Bacteria  Trace /HPF (NEGATIVE)   07/18/17  21:41    


 


Ur Culture Indicated?  Yes/culture set up   07/18/17  21:41    


 


Urine Opiates Screen  Negative  (NEG=<300)   07/18/17  21:41    


 


Urine Methadone Screen  Negative  (NEG=<300)   07/18/17  21:41    


 


Ur Barbiturates Screen  Negative  (NEG=<200)   07/18/17  21:41    


 


Ur Phencyclidine Scrn  Negative  (NEG=<25)   07/18/17  21:41    


 


Ur Amphetamines Screen  Negative  (NEG=<1000)   07/18/17  21:41    


 


U Benzodiazepines Scrn  Negative  (NEG=<200)   07/18/17  21:41    


 


Urine Cocaine Screen  Negative  (NEG=<300)   07/18/17  21:41    


 


U Marijuana (THC) Screen  Negative  (NEG=<50)   07/18/17  21:41    














- Plan


(1) UTI (urinary tract infection)


Status: Acute   


Qualifiers: 


   Urinary tract infection type: acute cystitis   Hematuria presence: without 

hematuria   Indwelling urinary catheter type: I   Encounter type: E   Qualified 

Code(s): N30.00 - Acute cystitis without hematuria   


Plan: START ROCEPHIN 1 GM IV DAILY, CONTINUE TO MONITOR   





(2) Altered mental status


Status: Acute   


Qualifiers: 


   Altered mental status type: unspecified   Coma depth: C   Coma timing: C   

Qualified Code(s): R41.82 - Altered mental status, unspecified   


Plan: CONTINUE TO MONITOR   





(3) Fever


Status: Acute   


Qualifiers: 


   Fever type: unspecified   Encounter type: E   Qualified Code(s): R50.9 - 

Fever, unspecified   


Plan: TYLENOL 650 MG PO Q4H PRN, CONTINUE TO MONITOR   





(4) Weakness generalized


Status: Acute   


Plan: IV FLUIDS, CONTINUE TO MONITOR, PT CONSULT   





(5) Yeast infection


Status: Suspected   


Plan: START DIFLUCAN 200MG IV DAILY, CONTINUE TO MONITOR   





(6) Hypertension


Status: Chronic   


Qualifiers: 


   Hypertension type: essential hypertension   Qualified Code(s): I10 - 

Essential (primary) hypertension   


Plan: CONTINUE NORVASC, CONTINUE LOPRESSOR, CONTINUE BENICAR, CONTINUE TO 

MONITOR   





(7) CHF (congestive heart failure)


Status: Chronic   


Qualifiers: 


   Congestive heart failure type: unspecified congestive heart failure type   

Congestive heart failure chronicity: chronic   Qualified Code(s): I50.9 - Heart 

failure, unspecified   


Plan: CONTINUE ALDACTONE, CONTINUE LASIX, CONTINUE TO MONITOR   





(8) Environmental allergies


Status: Chronic   


Plan: CONTINUE SINGULAIR, CONTINUE ZYRTEC, CONTINUE TO MONITOR   





(9) Arrhythmia


Status: Chronic   


Qualifiers: 


   Arrhythmia type: atrial fibrillation   Atrial fibrillation type: unspecified

   Atrial flutter type: A   Premature depolarization type: P   Qualified Code(s)

: I48.91 - Unspecified atrial fibrillation   


Plan: CONTINUE COUMADIN, CONTINUE AMIODARONE, CONTINUE TO MONITOR

## 2017-07-21 VITALS
SYSTOLIC BLOOD PRESSURE: 140 MMHG | SYSTOLIC BLOOD PRESSURE: 140 MMHG | DIASTOLIC BLOOD PRESSURE: 62 MMHG | DIASTOLIC BLOOD PRESSURE: 62 MMHG | DIASTOLIC BLOOD PRESSURE: 62 MMHG | SYSTOLIC BLOOD PRESSURE: 140 MMHG

## 2017-07-21 LAB
ALBUMIN SERPL BCP-MCNC: 2.8 G/DL (ref 3.4–5)
ALP SERPL-CCNC: 80 UNITS/L (ref 46–116)
ALT SERPL W P-5'-P-CCNC: 19 UNITS/L (ref 12–78)
AST SERPL W P-5'-P-CCNC: 24 UNITS/L (ref 15–37)
BASOPHILS # BLD AUTO: 0.1 X10^3/UL (ref 0–0.1)
BASOPHILS NFR BLD AUTO: 0.8 % (ref 0.2–1)
BUN SERPL-MCNC: 24 MG/DL (ref 7–18)
CALCIUM ALBUM COR SERPL-SCNC: (no result) MMOL/L (ref 136–145)
CALCIUM ALBUM COR SERPL-SCNC: 9.1 MG/DL (ref 8.5–10.1)
CALCIUM SERPL-MCNC: 8.1 MG/DL (ref 8.5–10.1)
CHLORIDE SERPL-SCNC: 107 MMOL/L (ref 98–107)
CO2 SERPL-SCNC: 25.2 MMOL/L (ref 21–32)
CREAT SERPL-MCNC: 1.27 MG/DL (ref 0.55–1.02)
EGFR  BLACK RACES: 52 (ref 60–?)
EOSINOPHIL # BLD AUTO: 0.3 X10^3/UL (ref 0–0.2)
EOSINOPHIL NFR BLD AUTO: 3.1 % (ref 0.9–2.9)
ERYTHROCYTE [DISTWIDTH] IN BLOOD BY AUTOMATED COUNT: 15.4 % (ref 11.6–16.5)
GLUCOSE BLD-SCNC: 101 MG/DL (ref 65–99)
HCT VFR BLD AUTO: 32.3 % (ref 36–47)
HGB BLD-MCNC: 11.1 G/DL (ref 12–16)
LYMPHOCYTES # BLD AUTO: 2 X10^3/UL (ref 1.3–2.9)
LYMPHOCYTES NFR BLD AUTO: 20.2 % (ref 21–51)
MCH RBC QN AUTO: 30.3 PG (ref 27–34)
MCHC RBC AUTO-ENTMCNC: 34.3 G/DL (ref 33–35)
MCV RBC AUTO: 88.3 FL (ref 80–100)
MONOCYTES # BLD AUTO: 1.3 X10^3/UL (ref 0.3–0.8)
MONOCYTES NFR BLD AUTO: 13.2 % (ref 0–13)
NEUTROPHILS # BLD AUTO: 6.1 X10^3/UL (ref 2.2–4.8)
NEUTROPHILS NFR BLD AUTO: 62.7 % (ref 42–75)
PLATELET # BLD: 191 X10^3/UL (ref 150–450)
PMV BLD AUTO: 9.6 FL (ref 7.4–11)
PROT SERPL-MCNC: 6.8 G/DL (ref 6.4–8.2)
RBC # BLD AUTO: 3.66 X10^6/UL (ref 3.5–5.4)
SODIUM SERPL-SCNC: 141 MMOL/L (ref 136–145)
WBC NRBC COR # BLD AUTO: 9.7 X10^3/UL (ref 3.6–10)

## 2017-07-21 RX ADMIN — BENZONATATE SCH MG: 100 CAPSULE ORAL at 06:00

## 2017-07-21 RX ADMIN — SODIUM CHLORIDE SCH MLS/HR: 900 INJECTION INTRAVENOUS at 06:01

## 2017-07-21 RX ADMIN — FUROSEMIDE SCH MG: 40 TABLET ORAL at 09:55

## 2017-07-21 RX ADMIN — FLUCONAZOLE SCH MLS/HR: 2 INJECTION, SOLUTION INTRAVENOUS at 09:55

## 2017-07-21 RX ADMIN — METOPROLOL TARTRATE SCH MG: 25 TABLET, FILM COATED ORAL at 09:56

## 2017-07-21 RX ADMIN — SPIRONOLACTONE SCH MG: 25 TABLET, FILM COATED ORAL at 09:56

## 2017-07-21 RX ADMIN — LEVOFLOXACIN SCH MLS/HR: 500 INJECTION, SOLUTION INTRAVENOUS at 09:54

## 2017-07-21 RX ADMIN — AMIODARONE HYDROCHLORIDE SCH MG: 200 TABLET ORAL at 09:58

## 2017-07-21 RX ADMIN — SODIUM CHLORIDE SCH MLS/HR: 9 INJECTION, SOLUTION INTRAVENOUS at 06:01

## 2017-07-21 RX ADMIN — OLMESARTAN MEDOXOMIL SCH MG: 40 TABLET, FILM COATED ORAL at 09:55

## 2017-07-21 RX ADMIN — ASPIRIN SCH MG: 81 TABLET, COATED ORAL at 09:55

## 2017-07-21 RX ADMIN — AMLODIPINE BESYLATE SCH MG: 5 TABLET ORAL at 09:55

## 2017-07-21 RX ADMIN — IPRATROPIUM BROMIDE AND ALBUTEROL SULFATE SCH EA: .5; 3 SOLUTION RESPIRATORY (INHALATION) at 08:58

## 2017-07-21 RX ADMIN — GUAIFENESIN DEXTROMETHORPHAN HYDROBROMIDE ORAL SOLUTION SCH ML: 200; 20 SOLUTION ORAL at 09:55

## 2017-07-21 NOTE — RAD
HISTORY:  Shortness of breath



Study:  Single-view chest



Comparison: July 20, 2017



Findings:



There are again changes of thoracotomy with median sternotomy sutures and metallic brain indicate mi
tral valve prosthesis. The trachea is midline. There is mild cardiomegaly with pulmonary vascular co
ngestion. Improving aeration is noted in the right lung base. Left lung base remains about the same.
 No pleural fluid or pneumothorax is seen. Osseous structures are intact



IMPRESSION:

 

Postsurgical changes as noted above.



Cardiomegaly with pulmonary vascular congestion.



Improving aeration in the right lung base. Small amount of atelectasis or infiltrate remains in the 
left lung base.





Reported By:Electronically Signed by SALENA MOSES MD at 7/21/2017 6:30:11 AM

## 2017-07-22 NOTE — DR.CARTERD
- Discharge Summary for:


Discharge Summary for Date of:: 07/21/17





- Admission Date


Date of Admission: 07/18/17





- Admission Diagnoses


Admission Diagnosis: 


(1) UTI (urinary tract infection)


(2) Altered mental status


(3) Fever


(4) Weakness generalized








- Discharge Diagnoses


Discharge Diagnosis: 


(1) UTI (urinary tract infection)


(2) Altered mental status


(3) Fever


(4) Weakness generalized


(5) Yeast infection


(6) Hypertension


(7) CHF (congestive heart failure)


(8) Environmental allergies


(9) Arrhythmia








- Hospital Course


Hospital Course: 


  IS A 77 YEAR OLD PATIENT OF OURS WHO PRESENTED TO THE EMERGENCY ROOM  

WITH COMPLAINTS OF WEAKNESS, INCOORDINATION, AND FEVER. FAMILY ALSO STATED THAT 

PATIENT HAD BEEN CONFUSED AND THAT SYMPTOMS STARTED SUDDENLY PRIOR TO COMING TO 

THE ER. PATIENT IS S/P MITRAL VALVE REPLACEMENT 6 WEEKS AGO. PATIENT DENIED ANY 

CHEST PAIN, HOWEVER, WAS NOTED WITH COUGH AND SHORTNESS OF BREATH. ON ARRIVAL 

TO ER, VITALS WERE 101.0. 92, 18, 98%, 146/66. LABS REPORTED A NORMAL CBC 

EXCEPT WBC 14.5. CMP WITHIN NORMAL LIMITS EXCEPT VUN 28, CREATININE 1.58, 

GLUCOSE 115, TOTAL BILIRUBIN 1.50. INR 2.79. URINALYSIS REPORTED WBC 10-15, RBC 

2-6, LEUKOCYTES 2+, OCCULT BLOOD 2+, PROTEIN 1+. WE ADMITTED PATIENT FOR FUTHER 

TREATMENT AND EVALUATION. WE STARTED PATIENT ON NS @80ML/HR,  ROCEPHIN 1GM IV 

DAILY, AND DUONEBS TID. 


ON DAY 2 OF STAY PATIENT CONTINUED WITH SHORTNESS OF BREATH AND COUGH. CBC 

REPORTED AN INCREASED WBC FROM 14.5 TO 19.0. WE CHECKED A CHEST XRAY WHICH 

REPORTED POSSIBLE DEVELOPING PNEUMONIA. WE DISCONTINUED ROCEPHIN IV AND STARTED 

FORTAZ IV AND LEVAQUIN IV.


OVER THE NEXT FEW DAYS, PATIENTS COUGH AND SHORTNESS OF BREATH BEGAN TO 

IMPROVE. BY DAY OF ADMISSION, WBC WAS DOWN TO 9.7. CMP WNL EXCEPT BUN 24, 

CREATININE 1.27, GLUCOSE 101, CALCIUM 8.1, TOTOAL BILIRUBIN 1.20, ALBUMIN 2.8. 

INR 3.73, PTT 66.5. CHEST XRAY REPORTED IMPROVING AEARATION IN LEFT LUNG BASE. 

WE PLANNED FOR DISCHARGE. INSTRUCTIONS FOR MEDICATION AND FOLLOW-UP WERE GIVEN 

TO PATIENT AND DAUGHTER. THEY BOTH VERBALIZED UNDERSTANDING. PATIENT WAS 

DISCHARGED TO HOME IN STABLE CONDITION WITH INSTRUCTIONS TO FOLLOW UP IN 

OFFICE. PATIENT WAS INSTRUCTED TO HOLD TONIGHTS DOSAGE OF COUMADIN AND TO 

RESUME TAKING IT TOMORRW. SHE WAS DICHARGED HOME ON HER CURRENT HOME MEDICATION 

AND A NEW PRESCRIPTION FOR LEVAQUIN, TESSALON PERLES, AND TUSSIONEX.  








- Discharge Medications


Discharge Medications: 





 





Amiodarone HCl 1 tab PO DAILY 07/18/17 [History]


Amlodipine Besylate [NORVASC 5 MG *] 1 tab PO DAILY 07/18/17 [History]


Furosemide 1 tab PO DAILY 07/18/17 [History]


Olmesartan Medoxomil [Benicar] 1 tab PO DAILY 07/18/17 [History]


Spironolactone 0.5 tab PO DAILY 07/18/17 [History]


Warfarin Sodium 1 tab PO HS 07/18/17 [History]


Benzonatate [TESSALON PERLES *] 200 mg PO TID PRN #30 cap 07/19/17 [Rx]


Metoprolol Tartrate 25 mg PO HS 07/19/17 [History]


Hydrocodone Polist/Chlorphenir [Tussionex Pennkinetic Susp] 5 ml PO Q12H PRN #

60 ml 07/21/17 [Rx]


Levofloxacin [LEVAQUIN  MG *] 500 mg PO DAILY #7 tab 07/21/17 [Rx]

## 2018-01-15 ENCOUNTER — HOSPITAL ENCOUNTER (OUTPATIENT)
Dept: HOSPITAL 24 - RAD | Age: 78
End: 2018-01-15
Attending: INTERNAL MEDICINE
Payer: COMMERCIAL

## 2018-01-15 DIAGNOSIS — H92.23: ICD-10-CM

## 2018-01-15 DIAGNOSIS — R26.0: Primary | ICD-10-CM

## 2018-01-15 DIAGNOSIS — S09.8XXA: ICD-10-CM
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
## 2018-01-15 PROCEDURE — 70450 CT HEAD/BRAIN W/O DYE: CPT

## 2018-01-15 NOTE — CT
HISTORY:  Ataxic gait, head injury, head trauma.



Noncontrast head CT examination.



Comparison: Head CT exam dated 01/10/2014. 



Technique:

Multiple axial images of the brain were obtained from the skull base to the vertex without administra
tion of IV contrast.



Findings: There is moderate sulcal and cisternal prominence as well as atherosclerotic change in the 
proximal intracranial carotid and vertebral arteries, which is not out of proportion to the patient's
 stated age. There is diffuse CT density alteration seen in the periventricular white matter of the h
igh and mid-convexity, which is likely in the setting of small vessel disease and not out of proporti
on to the patient's stated age. There is no pathologic ventricular dilatation or CT evidence for hydr
ocephalus or herniation syndrome. No midline shift is evident. No acute intraparenchymal hemorrhage o
r mass can be identified.  No extra-axial fluid collections are seen.  No alteration in the attenuati
on of the brain parenchyma can be identified to suggest acute or subacute ischemic change.  The extra
cranial structures are unremarkable. However, should this patient's clinical symptoms persist, follow
-up MRI imaging of the brain is recommended.





IMPRESSION:

 

1.  No acute intracranial process or bleed identified.



2. Age-appropriate intra-cranial changes of advancing age.











Reported By:Electronically Signed by NATALIIA WAGNER III, MD at 1/15/2018 1:20:15 PM

## 2018-01-31 ENCOUNTER — HOSPITAL ENCOUNTER (OUTPATIENT)
Dept: HOSPITAL 24 - RAD | Age: 78
End: 2018-01-31
Attending: PHYSICIAN ASSISTANT
Payer: COMMERCIAL

## 2018-01-31 DIAGNOSIS — R09.89: ICD-10-CM

## 2018-01-31 DIAGNOSIS — Z95.2: ICD-10-CM

## 2018-01-31 DIAGNOSIS — R41.0: Primary | ICD-10-CM

## 2018-01-31 LAB
BUN SERPL-MCNC: 34 MG/DL (ref 7–18)
CREAT SERPL-MCNC: 1.88 MG/DL (ref 0.55–1.02)

## 2018-01-31 PROCEDURE — 36415 COLL VENOUS BLD VENIPUNCTURE: CPT

## 2018-01-31 PROCEDURE — 70551 MRI BRAIN STEM W/O DYE: CPT

## 2018-01-31 PROCEDURE — 93880 EXTRACRANIAL BILAT STUDY: CPT

## 2018-01-31 PROCEDURE — 93306 TTE W/DOPPLER COMPLETE: CPT

## 2018-01-31 PROCEDURE — 84520 ASSAY OF UREA NITROGEN: CPT

## 2018-01-31 PROCEDURE — 82565 ASSAY OF CREATININE: CPT

## 2018-01-31 NOTE — VAS
HISTORY: Concern for carotid artery stenosis. Disorientation. Altered mental status.



Technique: Multiple gray scale and color flow Doppler images of the right and left carotid arterial s
ystem were obtained. The vertebral arterial system was evaluated as well. 



Findings: 

Nonocclusive color flow Doppler is seen throughout the right and left carotid arterial system. No hem
odynamically significant carotid arterial stenosis is seen based on velocity criteria. There is mild 
atherosclerosis and plaque formation of the bilateral carotid bulbs and proximal ICAs with associated
 intimal thickening but without evidence for high-grade stenosis (>70%) or occlusion of the carotid a
rteries. The right and left vertebral artery demonstrate antegrade flow.





IMPRESSION:



Mild atherosclerosis and plaque formation of the bilateral carotid bulbs and proximal ICAs with assoc
iated carotid intimal thickening but without evidence for high-grade stenosis or occlusion of the car
otid arteries, based on Doppler velocity criteria.



Appropriate, antegrade, vertebral arterial flow.



Peak right ICA velocity: 106 cm/sec

Peak left ICA velocity: 71 cm/sec



Right ICA to CCA ratio: 1.84.

Left ICA to CCA ratio: 0.82.





Reported By:Electronically Signed by NATALIIA WAGNER III, MD at 1/31/2018 3:22:20 PM

## 2018-02-01 NOTE — MRI
STUDY: MRI OF THE BRAIN WITHOUT GADOLINIUM



HISTORY:  Disoriented. Double vision.



Technique: Multiplanar multi-sequence MRI of the brain was obtained utilizing standard departmental p
rotocol. Sagittal and axial T1, axial T2, FLAIR, diffusion (DWI/ADC), GRE and coronal T2 images throu
gh the brain were performed. 



Comparison: Head CT from January 15, 2018.



Findings:



The sulci, cisterns and ventricles are prominent consistent with diffuse volume loss. There are confl
uent and scattered foci of T2 prolongation in the periventricular and subcortical white matter of bot
h hemispheres. This is a nonspecific finding which likely represents microangiopathic change in a pat
ient of this age. 



There is an old infarct in the left cerebellar hemisphere. There are several foci of T2 prolongation 
in the right cerebellar hemisphere. These have associated signal abnormality on diffusion images cons
istent with T2 shine through. No foci of restricted diffusion are identified. 



There is a focus of abnormal susceptibility in the posterior aspect of the 4th ventricle. There is an
other focus of abnormal susceptibility in the periventricular white matter of the left corona radiata
. Several additional smaller foci of susceptibility are noted in the left temporal lobe and in the po
steromedial left parietal lobe. 



There is no evidence of acute territorial infarction, acute hemorrhage, mass, mass effect, or midline
 shift. There are no abnormal intra-axial or extra-axial fluid collections. The major intracranial va
scular flow voids appear intact. The left vertebral artery appears dominant. 



IMPRESSION:

 

1.  Punctate subacute to early chronic infarcts in the right cerebellar hemisphere.



2.  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



3.  Nonspecific white matter change and volume loss. 



4.  Multiple foci of susceptibility as described, most consistent with focal hemosiderin deposition. 
These findings may be seen as a sequelae of prior closed head injury. However, that may also be seen 
in the setting of chronic are poorly controlled hypertension. Clinical correlation is required.







Reported By:Electronically Signed by YULIANA ARAGON MD at 2/1/2018 9:05:21 AM

## 2018-08-01 NOTE — DR.H&P
H&P





- History & Physical for Day of:


H&P Date: 07/18/17





- Chief Complaint


Chief Complaint: ALTERED MENTAL STATUS,WEAKNESS, FEVER





- Allergies


Allergies/Adverse Reactions: 


 Allergies











Allergy/AdvReac Type Severity Reaction Status Date / Time


 


No Known Drug Allergies Allergy   Verified 07/18/17 23:57














- History of Present Illness


History of Present Illness:  IS A 77 YEAR OLD PATIENT OF OURS WHO 

PRESENTED TO THE EMERGENCY ROOM LAST NIGHT WITH COMPLAINTS OF WEAKNESS, 

INCOORDINATION, AND FEVER. FAMILY ALSO STATED THAT PATIENT HAD BEEN CONFUSED 

AND THAT SYMPTOMS STARTED SUDDENLY PRIOR TO COMING TO THE ER. PATIENT IS S/P 

MITRAL VALVE REPLACEMENT 6 WEEKS AGO. PATIENT DENIED ANY CHEST PAIN, HOWEVER, 

WAS NOTED WITH COUGH. ON ARRIVAL TO ER, VITALS WERE 101.0. 92, 18, 98%, 146/66. 

LABS REPORTED A NORMAL CBC EXCEPT WBC 14.5. CMP WITHIN NORMAL LIMITS EXCEPT VUN 

28, CREATININE 1.58, GLUCOSE 115, TOTAL BILIRUBIN 1.50. INR 2.79. URINALYSIS 

REPORTED WBC 10-15, RBC 2-6, LEUKOCYTES 2+, OCCULT BLOOD 2+, PROTEIN 1+. WE 

ADMITTED PATIENT FOR FUTHER TREATMENT AND EVALUATION. WE WILL START PATIENT ON 

NS @80ML/HR, START ROCEPHIN 1GM IV DAILY, AND DUONEBS TID. WE WILL RECHECK LABS 

AND CHEST XRAY AND FOLLOW UP WITH PATIENT IN AM.





- Past Medical History


Past Medical History: Angina, CHF, Coronary Artery Disease, Dyslipidemia, GERD, 

Hypertension, MI


Additional Medical History: VALVULAR HEART DISEASE, CARDIAC ARRHYTHMIA





- Past Surgical History


Surgical History: CABG/Valve Surgery


Additional Surgical History: CATARACTS, MITRAL VALVE REPLACEMENT X 2





- Family History


Family Medical History: Coronary Artery Disease





- Social History


Does patient currently use any type of tobacco product: No


Have you used tobacco products in the last 12 months: No


Type of Tobacco Use: None


Alcohol Use: None


Drug Use: None





- Medications


Home Medications: 


 





Amiodarone HCl 1 tab PO DAILY 07/18/17 [History Confirmed 07/18/17]


Amlodipine Besylate [NORVASC 5 MG *] 1 tab PO DAILY 07/18/17 [History Confirmed 

07/18/17]


Furosemide [Furosemide] 1 tab PO DAILY 07/18/17 [History Confirmed 07/18/17]


Olmesartan Medoxomil [Benicar] 1 tab PO DAILY 07/18/17 [History Confirmed 07/18/ 17]


Spironolactone 0.5 tab PO DAILY 07/18/17 [History Confirmed 07/18/17]


Warfarin Sodium 1 tab PO HS 07/18/17 [History Confirmed 07/18/17]











- Review of Systems


Constitutional: See HPI, Fever, Weakness


Eyes: No Symptoms Reported.  denies: See HPI, Pain, Vision Change, Conjunctivae 

Inflammation, Eyelid Inflammation, Redness, Other


ENT: No Symptoms Reported.  denies: See HPI, Ear Pain, Ear Discharge, Nose Pain

, Nose Discharge, Nose Congestion, Mouth Pain, Mouth Swelling, Throat Pain, 

Throat Swelling, Other


Respiratory: No Symptoms Reported.  denies: See HPI, Cough, Dry, Shortness of 

Breath, Hemoptysis, SOB with Excertion, Pleuritic Pain, Sputum, Wheezing, Other


Cardiovascular: No Symptoms Reported.  denies: Chest Pain, See HPI, Palpitations

, Orthopnea, Paroxysmal Noc. Dyspnea, Edema, Light Headedness, Other


Gastrointestinal: No Symptoms Reported.  denies: See HPI, Nausea, Vomiting, 

Abdominal Pain, Diarrhea, Constipation, Melena, Hematochezia, Other


Genitourinary: No Symptoms Reported.  denies: See HPI, Dysuria, Frequency, 

Incontinence, Hematuria, Retention, Other


Musculoskeletal: No Symptoms Reported.  denies: See HPI, Shoulder Pain, Arm Pain

, Back Pain, Hand Pain, Leg Pain, Foot Pain, Neck Pain, Other


Skin: No Symptoms Reported.  denies: See HPI, Rash, Lesions, Jaundice, Bruising

, Wound, Ecchymosis, Other


Neurological: See HPI, Weakness, Incoordination, Confusion





- Physical Exam


Vital Signs: 


 





Temperature                      98.4 F


Pulse Rate [Left Brachial]       91


Pulse Rate                       70


Respiratory Rate                 20


Blood Pressure [Right Arm]       111/60


O2 Sat by Pulse Oximetry         97








Oriented: Person


Eyes: Normal.  negative: Blurred Vision, Diplopia, Discharge, Pain, Redness, 

Photophobia, Other


Ear: Normal.  negative: Right, Left, Swelling, Ecchymosis, Hemotypanum, Abrasion

, Laceration


Nose: Normal.  negative: Injected, Discharge, Blood, Other


Throat: Normal.  negative: Tonsillar Hypertrophy, Red, Exudate, Dry, Other


Respiratory: Clear Throughout.  negative: Diminished Throughout, Rhonchi 

Throughout, Rales Throughout, Wheezes Throughout, RUL Clear, RML Clear, RLL 

Clear, BEVERLY Clear, LML Clear, LLL Clear, RUL Diminished, RML Diminished, RLL 

Diminished, BEVERLY Diminished, LML Diminished, LLL Diminished, RUL Absent, RML 

Absent, RLL Absent, BEVERLY Absent, LML Absent, LLL Absent, RUL Rhonchi, RML Rhonchi

, RLL Rhonchi, BEVERLY Rhonchi, LML Rhonchi, LLL Rhonchi, RUL Insp. Wheeze, RML 

Insp. Wheeze, RLL Insp. Wheeze, BEVERLY Insp.Wheeze, LML Insp.Wheeze, LLL 

Insp.Wheeze, RUL Exp. Wheeze, RML Exp. Wheeze, RLL Exp. Wheeze, BEVERLY Exp. Wheeze

, LML Exp. Wheeze, LLL Exp. Wheeze, RUL Rales, RML Rales, RLL Rales, BEVERLY Rales, 

LML Rales, LLL Rales, RUL Rub, RML Rub, RLL Rub, BEVERLY Rub, LML Rub, LLL Rub, RUL 

Squeak, RML Squeak, RLL Squeak, BEVERLY Squeak, LML Squeak, LLL Squeak


Cardiovascular: Normal.  negative: Tachycardia, Bradycardia, Irregular, S3, S4, 

Systolic, Diastolic, Murmur, Edema, Other


: Normal.  negative: Dysuria, Hematuria, Frequency, Discharge, Testicular Pain

, Bleeding, Pregnant, Other


Auscultation: Bowel Sounds: Normal.  negative: Bruit, Absent, Increased, 

Decreased, High Pitched, Other


Palpation: Normal.  negative: Spleen Enlarged, Liver Enlarged, Mass Pulsatile, 

Other


Tenderness: Normal.  negative: Diffuse, RUQ, RLQ, LUQ, LLQ, Epigastric, 

Periumbilical, Suprapubic, Mild, Moderate, Severe, Rebound, Guarding, Rigidity, 

Other


Skin: Normal.  negative: Decreased Turgur, Rash, Papular, Macular, Maculopapular

, Vesicular, Pustular, Petechial, Red, Tender, Hot, Diaphoresis, Wound, Bruising

, Ecchymosis, Other


Musculoskeletal: Normal.  negative: Right, Left, Shoulder, Clavicle, Arm, Elbow

, Forearm, Wrist, Hand, Hip, Thigh, Knee, Leg, Ankle, Foot, Back:Thoracic, Back:

Lumbar, Back:Midline, Back:Paraspinous, Pelvis, Swelling, Tender, Deformity, 

Pulse Deficit, Motor Deficit, Sensory Deficit, Instability, Crepitance


Psychiatric: Normal.  negative: Anxiety, Depression, Agitation, Other


Mood Description: Calm


Affect: Normal


Speech Pattern: Unclear





- Assessment/Plan


(1) UTI (urinary tract infection)


Qualifiers: 


   Urinary tract infection type: acute cystitis   Hematuria presence: without 

hematuria   Indwelling urinary catheter type: I   Encounter type: E   Qualified 

Code(s): N30.00 - Acute cystitis without hematuria   


Status: Acute   


Plan: START ROCEPHIN 1GM DAILY, CONTINUE TO MONITOR See dictation